# Patient Record
Sex: FEMALE | Race: BLACK OR AFRICAN AMERICAN
[De-identification: names, ages, dates, MRNs, and addresses within clinical notes are randomized per-mention and may not be internally consistent; named-entity substitution may affect disease eponyms.]

---

## 2017-01-16 ENCOUNTER — HOSPITAL ENCOUNTER (INPATIENT)
Dept: HOSPITAL 61 - ER | Age: 52
LOS: 2 days | Discharge: HOME | DRG: 392 | End: 2017-01-18
Attending: INTERNAL MEDICINE | Admitting: INTERNAL MEDICINE
Payer: COMMERCIAL

## 2017-01-16 VITALS — SYSTOLIC BLOOD PRESSURE: 145 MMHG | DIASTOLIC BLOOD PRESSURE: 76 MMHG

## 2017-01-16 VITALS — SYSTOLIC BLOOD PRESSURE: 167 MMHG | DIASTOLIC BLOOD PRESSURE: 76 MMHG

## 2017-01-16 VITALS — BODY MASS INDEX: 36 KG/M2 | WEIGHT: 224 LBS | HEIGHT: 66 IN

## 2017-01-16 VITALS — DIASTOLIC BLOOD PRESSURE: 51 MMHG | SYSTOLIC BLOOD PRESSURE: 124 MMHG

## 2017-01-16 DIAGNOSIS — Z88.6: ICD-10-CM

## 2017-01-16 DIAGNOSIS — D64.9: ICD-10-CM

## 2017-01-16 DIAGNOSIS — I27.2: ICD-10-CM

## 2017-01-16 DIAGNOSIS — K21.9: Primary | ICD-10-CM

## 2017-01-16 DIAGNOSIS — Z82.49: ICD-10-CM

## 2017-01-16 DIAGNOSIS — Z80.49: ICD-10-CM

## 2017-01-16 DIAGNOSIS — Z79.891: ICD-10-CM

## 2017-01-16 DIAGNOSIS — M79.1: ICD-10-CM

## 2017-01-16 DIAGNOSIS — Z90.81: ICD-10-CM

## 2017-01-16 DIAGNOSIS — Z91.19: ICD-10-CM

## 2017-01-16 DIAGNOSIS — Z71.6: ICD-10-CM

## 2017-01-16 DIAGNOSIS — G89.29: ICD-10-CM

## 2017-01-16 DIAGNOSIS — F17.200: ICD-10-CM

## 2017-01-16 DIAGNOSIS — Z88.0: ICD-10-CM

## 2017-01-16 LAB
ALBUMIN SERPL-MCNC: 3.8 G/DL (ref 3.4–5)
ALP SERPL-CCNC: 94 U/L (ref 46–116)
ALT SERPL-CCNC: 22 U/L (ref 14–59)
ANION GAP SERPL CALC-SCNC: 4 MMOL/L (ref 6–14)
ANISOCYTOSIS BLD QL SMEAR: SLIGHT
AST SERPL-CCNC: 25 U/L (ref 15–37)
BASOPHILS # BLD AUTO: 0.1 X10^3/UL (ref 0–0.2)
BASOPHILS NFR BLD: 1 % (ref 0–3)
BILIRUB DIRECT SERPL-MCNC: 0.4 MG/DL (ref 0–0.2)
BILIRUB SERPL-MCNC: 1.7 MG/DL (ref 0.2–1)
BUN SERPL-MCNC: 12 MG/DL (ref 7–20)
CALCIUM SERPL-MCNC: 9.1 MG/DL (ref 8.5–10.1)
CHLORIDE SERPL-SCNC: 108 MMOL/L (ref 98–107)
CHOLEST SERPL-MCNC: 137 MG/DL (ref 0–200)
CHOLEST/HDLC SERPL: 2.6 {RATIO}
CO2 SERPL-SCNC: 32 MMOL/L (ref 21–32)
CREAT SERPL-MCNC: 0.9 MG/DL (ref 0.6–1)
EOSINOPHIL NFR BLD: 3 % (ref 0–3)
ERYTHROCYTE [DISTWIDTH] IN BLOOD BY AUTOMATED COUNT: 15.6 % (ref 11.5–14.5)
GFR SERPLBLD BASED ON 1.73 SQ M-ARVRAT: 80.2 ML/MIN
GLUCOSE SERPL-MCNC: 77 MG/DL (ref 70–99)
HCT VFR BLD CALC: 35.5 % (ref 36–47)
HDLC SERPL-MCNC: 53 MG/DL (ref 40–60)
HGB BLD-MCNC: 11.2 G/DL (ref 12–15.5)
HGB S BLD QL SOLY: (no result)
HYPOCHROMIA BLD QL SMEAR: SLIGHT
LYMPHOCYTES # BLD: 2.6 X10^3/UL (ref 1–4.8)
LYMPHOCYTES NFR BLD AUTO: 22 % (ref 24–48)
MCH RBC QN AUTO: 24 PG (ref 25–35)
MCHC RBC AUTO-ENTMCNC: 32 G/DL (ref 31–37)
MCV RBC AUTO: 76 FL (ref 79–100)
MONOCYTES NFR BLD: 9 % (ref 0–9)
NEUTROPHILS NFR BLD AUTO: 65 % (ref 31–73)
PLATELET # BLD AUTO: 261 X10^3/UL (ref 140–400)
PLATELET # BLD EST: ADEQUATE 10*3/UL
POIKILOCYTOSIS BLD QL SMEAR: PRESENT
POLYCHROMASIA BLD QL SMEAR: SLIGHT
POTASSIUM SERPL-SCNC: 4.3 MMOL/L (ref 3.5–5.1)
PROT SERPL-MCNC: 7.3 G/DL (ref 6.4–8.2)
RBC # BLD AUTO: 4.69 X10^6/UL (ref 3.5–5.4)
SODIUM SERPL-SCNC: 144 MMOL/L (ref 136–145)
TARGETS BLD QL SMEAR: (no result)
TRIGL SERPL-MCNC: 74 MG/DL (ref 0–150)
WBC # BLD AUTO: 11.9 X10^3/UL (ref 4–11)

## 2017-01-16 PROCEDURE — 90686 IIV4 VACC NO PRSV 0.5 ML IM: CPT

## 2017-01-16 PROCEDURE — 80048 BASIC METABOLIC PNL TOTAL CA: CPT

## 2017-01-16 PROCEDURE — 81001 URINALYSIS AUTO W/SCOPE: CPT

## 2017-01-16 PROCEDURE — 99406 BEHAV CHNG SMOKING 3-10 MIN: CPT

## 2017-01-16 PROCEDURE — 96374 THER/PROPH/DIAG INJ IV PUSH: CPT

## 2017-01-16 PROCEDURE — 85007 BL SMEAR W/DIFF WBC COUNT: CPT

## 2017-01-16 PROCEDURE — 93306 TTE W/DOPPLER COMPLETE: CPT

## 2017-01-16 PROCEDURE — 80061 LIPID PANEL: CPT

## 2017-01-16 PROCEDURE — 71020: CPT

## 2017-01-16 PROCEDURE — 93005 ELECTROCARDIOGRAM TRACING: CPT

## 2017-01-16 PROCEDURE — 85027 COMPLETE CBC AUTOMATED: CPT

## 2017-01-16 PROCEDURE — 80076 HEPATIC FUNCTION PANEL: CPT

## 2017-01-16 PROCEDURE — 36415 COLL VENOUS BLD VENIPUNCTURE: CPT

## 2017-01-16 PROCEDURE — 84484 ASSAY OF TROPONIN QUANT: CPT

## 2017-01-16 RX ADMIN — ACETAMINOPHEN PRN MG: 325 TABLET ORAL at 20:47

## 2017-01-16 RX ADMIN — CLONIDINE HYDROCHLORIDE SCH MG: 0.1 TABLET ORAL at 23:03

## 2017-01-16 RX ADMIN — CLONIDINE HYDROCHLORIDE SCH MG: 0.1 TABLET ORAL at 15:30

## 2017-01-16 NOTE — CARD
--------------- APPROVED REPORT --------------





EXAM: Two-dimensional and M-mode echocardiogram with Doppler and color Doppler.



Other Information 

Quality : Good



INDICATION

Hypertension/HCVD

Chest Pain 



2D DIMENSIONS 

RVDd2.7 (2.9-3.5cm)Left Atrium(2D)4.4 (1.6-4.0cm)

IVSd1.2 (0.7-1.1cm)Aortic Root(2D)2.9 (2.0-3.7cm)

LVDd5.2 (3.9-5.9cm)LVOT Diameter2.1 (1.8-2.4cm)

PWd1.3 (0.7-1.1cm)LVDs3.6 (2.5-4.0cm)

FS (%) 32.0 %SV78.1 ml

LVEF(%)59.7 (>50%)



Aortic Valve

AoV Peak Mark.132.8cm/sAoV VTI29.1cm

AO Peak GR.7.1mmHgLVOT Peak Mark.100.7cm/s

LVOT  VTI 22.91cmAO Mean GR.4mmHg

BONNIE (VMAX)2.33vj0PDQ   (VTI)2.64cm2



Mitral Valve

MV E Ohtvullf67.1cm/sMV DECEL [id number]

MV A Jfnsuihf03.8cm/sMV GML86do

E/A  Ratio1.3MVA (PHT)3.19cm2



TDI

E/Lateral E'7.4E/Medial E'9.8



Tricuspid Valve

TR P. Giecadub005pb/sRAP WQFXGZVN7bfPy

TR Peak Gr.24sxPoWSDJ65qwWe



Pulmonary Vein

S1 Auepmqap90.6cm/sD2 Llngdlnm64.2cm/s

PVa sopervqf382kjxy



 LEFT VENTRICLE 

The left ventricle is normal size. There is mild concentric left ventricular hypertrophy. The left ve
ntricular systolic function is normal. The Ejection Fraction is 55-60%. There is normal LV segmental 
wall motion.



 RIGHT VENTRICLE 

The right ventricle is normal size. The right ventricular systolic function is normal.



 ATRIA 

The left atrium is mildly dilated. The right atrium size is normal. The interatrial septum is intact 
with no evidence for an atrial septal defect or patent foramen ovale as noted on 2-D or Doppler imagi
ng.



 AORTIC VALVE 

The aortic valve is normal in structure and function. Doppler and Color Flow revealed trace aortic re
gurgitation. There is no significant aortic valvular stenosis.



 MITRAL VALVE 

The mitral valve is normal in structure and function. There is no evidence of mitral valve prolapse. 
There is no mitral valve stenosis. Doppler and Color-flow revealed trace to mild mitral regurgitation
.



 TRICUSPID VALVE 

The tricuspid valve is normal in structure and function. Doppler and Color Flow revealed mild tricusp
id regurgitation. There is moderate pulmonary hypertension. The PA pressure was estimated at 41 mmHg.
 There is no tricuspid valve stenosis.



 PULMONIC VALVE 

The pulmonary valve is normal in structure and function. Doppler and Color Flow revealed mild pulmoni
c valvular regurgitation. There is no pulmonic valvular stenosis.



 GREAT VESSELS 

The aortic root is normal in size. The ascending aorta is normal in size. The IVC is normal in size a
nd collapses >50% with inspiration.



 PERICARDIAL EFFUSION 

There is no evidence of significant pericardial effusion.



Critical Notification

Critical Value: No



<Conclusion>

The left ventricular systolic function is normal.

The Ejection Fraction is 55-60%.

There is normal LV segmental wall motion.

The left atrium is mildly dilated.

Trace aortic regurgitation.

Trace to mild mitral regurgitation.

Mild tricuspid regurgitation.

There is moderate pulmonary hypertension.

The PA pressure was estimated at 41 mmHg.

There is no evidence of significant pericardial effusion.

## 2017-01-16 NOTE — EKG
[facility]

               [address] [place] [address]

Test Date:    2017               Test Time:    11:12:04

Pat Name:     ROSENDA GREEN           Department:   

Patient ID:   [id number]           Room:          

Gender:       F                        Technician:   

:          [id number]               Requested By: JANET WILKERSON

Order Number: [id number]            Reading MD:   Marilou Lantigua

                                 Measurements

Intervals                              Axis          

Rate:         55                       P:            52

IA:           130                      QRS:          -36

QRSD:         84                       T:            -11

QT:           476                                    

QTc:          458                                    

                           Interpretive Statements

SINUS RHYTHM

LEFT ATRIAL ABNORMALITY

ABNORMAL LEFT AXIS DEVIATION

LEFT ANTERIOR FASCICULAR BLOCK

ABNORMAL ECG



Electronically Signed On 2017 9:43:16 CST by Marilou Lantigua

## 2017-01-16 NOTE — PDOC2
CASSIE GONZALEZ APRN 1/16/17 1421:


CARDIAC CONSULT


DATE OF CONSULT


Date of Consult


DATE: 1/16/17 


TIME: 14:01





REASON FOR CONSULT


Reason for Consult:


CP, h/o sickle cell.





REFERRING PHYSICIAN


Referring Physician:


Black





SOURCE


Source:  Chart review, Patient





HISTORY OF PRESENT ILLNESS


HISTORY OF PRESENT ILLNESS


This is a pleasant 52 yo female admitted for complains of chest pain.  Reports 

that this started yesterday evening and went on overnight on waxing and waning.

  This is not associated with nausea or any SOA.  Reports no lightheadedness 

but this was this was focal to mid chest sharp in consistency and reproducible 

with palpation and with episodes of palpitations.  Also has been having back 

pain but this is chronic for her utilizing opioids.  Denies any falls, recent 

injury, long distance driving.  No prior cough or respiratory infection.  She 

does not take her BP readings at home and verbalized 3 BP meds but she does not 

take this regularly and has been skipping.  She did end up taking her antiHTN 

meds last night with addition of ASA.  She continue to smoke tobacco and her 

diet comprises of processed meals with very high sodium content as noted by her 

significant other as well. Also reports that she has not been drinking 

adequately. Activity wise, notable for METS>10, doing daily laundry utilizing 

stairs at least 4x daily.





PAST MEDICAL HISTORY


Cardiovascular:  HTN


Pulmonary:  No pertinent hx


CENTRAL NERVOUS SYSTEM:  Other (No pertinet history)


GI:  No pertinent hx


Heme/Onc:  No pertinent hx, Other (Sickle cell beta thalassemia)


Hepatobiliary:  No pertinent hx


Psych:  No pertinent hx


Musculoskeletal:  Other (chronic back pain)


Rheumatologic:  No pertinent hx


Infectious disease:  No pertinent hx


ENT:  No pertinent hx


Renal/:  UTI


Endocrine:  No pertinent hx


Dermatology:  No pertinent hx





PAST SURGICAL HISTORY


Past Surgical History:  Tubal Ligation, Other (splenectomy; hx of brain surgery 

involving swelling and AVM? pt is not sure)





FAMILY HISTORY


Family History:  Other (sickel cell)





SOCIAL HISTORY


Smoke:  <1 pack per day (>10 yrs)


ALCOHOL:  none


Drugs:  None


Lives:  with Family





CURRENT MEDICATIONS


CURRENT MEDICATIONS





Current Medications








 Medications


  (Trade)  Dose


 Ordered  Sig/Yonas


 Route


 PRN Reason  Start Time


 Stop Time Status Last Admin


Dose Admin


 


 Aspirin 324 mg  324 mg  1X  ONCE


 PO


   1/16/17 12:00


 1/16/17 12:01 DC 1/16/17 12:00


 


 


 Sodium Chloride


  (Iv Sodium


 Chloride 0.9%


 1000ml Bag)  1,000 ml @ 


 1,000 mls/hr  Q1H


 IV


   1/16/17 11:48


 1/16/17 12:47 DC 1/16/17 11:48


 


 


 Fentanyl Citrate


  (Fentanyl 2ml


 Vial)  50 mcg  1X  ONCE


 IV


   1/16/17 12:00


 1/16/17 12:01 DC 1/16/17 12:32


 


 


 Clonidine HCl


  (Catapres)  0.1 mg  1X  ONCE


 PO


   1/16/17 12:45


 1/16/17 12:46 DC 1/16/17 12:45


 


 


 Amlodipine


 Besylate


  (Norvasc)  5 mg  1X  ONCE


 PO


   1/16/17 12:45


 1/16/17 12:46 DC 1/16/17 13:14


 











ALLERGIES


ALLERGIES:  


Coded Allergies:  


     morphine (Verified  Allergy, Intermediate, RASH, ITCHING, tolerates 

Dilaudid, 8/10/16)


 PATIENT USUALLY NEEDS TO TAKE BENADRYL WITH ANY PAIN


 MEDICATON SHE TAKES.


     penicillin (Verified  Allergy, Intermediate, RASH/HIVES, 8/10/16)





ROS


Review of System


14 point ROS evaluated with pertinent positives noted per HPI





PHYSICAL EXAM


General:  Alert, Oriented X3, Cooperative, No acute distress


HEENT:  Atraumatic, Mucous membr. moist/pink


Lungs:  Clear to auscultation, Normal air movement


Heart:  Regular rate, Normal S1, Normal S2, Other (2/6 systolic murmur to LLS 

border)


Extremities:  No cyanosis, No edema


Skin:  No breakdown, No significant lesion


Neuro:  Normal speech, Sensation intact


Psych/Mental Status:  Mental status NL, Mood NL


MUSCULOSKELETAL:  Osteoarthritic changes both hands





VITALS


VITALS





 Vital Signs








  Date Time  Temp Pulse Resp B/P Pulse Ox O2 Delivery O2 Flow Rate FiO2


 


1/16/17 13:14  48  199/99    


 


1/16/17 11:11 98.4  20  98 Room Air  





 98.4       











LABS


Lab:





Laboratory Tests








Test


  1/16/17


11:20


 


White Blood Count


  11.9x10^3/uL


(4.0-11.0)


 


Red Blood Count


  4.69x10^6/uL


(3.50-5.40)


 


Hemoglobin


  11.2g/dL


(12.0-15.5)


 


Hematocrit


  35.5%


(36.0-47.0)


 


Mean Corpuscular Volume 76fL () 


 


Mean Corpuscular Hemoglobin 24pg (25-35) 


 


Mean Corpuscular Hemoglobin


Concent 32g/dL (31-37) 


 


 


Red Cell Distribution Width


  15.6%


(11.5-14.5)


 


Platelet Count


  261x10^3/uL


(140-400)


 


Neutrophils (%) (Auto) 65% (31-73) 


 


Lymphocytes (%) (Auto) 22% (24-48) 


 


Monocytes (%) (Auto) 9% (0-9) 


 


Eosinophils (%) (Auto) 3% (0-3) 


 


Basophils (%) (Auto) 1% (0-3) 


 


Neutrophils # (Auto)


  7.8x10^3uL


(1.8-7.7)


 


Lymphocytes # (Auto)


  2.6x10^3/uL


(1.0-4.8)


 


Monocytes # (Auto)


  1.1x10^3/uL


(0.0-1.1)


 


Eosinophils # (Auto)


  0.4x10^3/uL


(0.0-0.7)


 


Basophils # (Auto)


  0.1x10^3/uL


(0.0-0.2)


 


Platelet Estimate


  Adequate


(ADEQUATE)


 


Large Platelets Present 


 


Polychromasia Slight 


 


Hypochromasia Slight 


 


Poikilocytosis Present 


 


Anisocytosis Slight 


 


Sickle Cells Few 


 


Target Cells Mod 


 


Sodium Level


  144mmol/L


(136-145)


 


Potassium Level


  4.3mmol/L


(3.5-5.1)


 


Chloride Level


  108mmol/L


()


 


Carbon Dioxide Level


  32mmol/L


(21-32)


 


Anion Gap 4 (6-14) 


 


Blood Urea Nitrogen 12mg/dL (7-20) 


 


Creatinine


  0.9mg/dL


(0.6-1.0)


 


Estimated GFR


(Cockcroft-Gault) 80.2 


 


 


Glucose Level


  77mg/dL


(70-99)


 


Calcium Level


  9.1mg/dL


(8.5-10.1)


 


Troponin I Quantitative


  0.045ng/mL


(0.000-0.055)











ASSESSMENT/PLAN


ASSESSMENT/PLAN


1. Atypical chest pain: retrosternal reproducible with palpation. initial 

troponin normal.  EKG SR with LAFB with otherwise no acute changes. Doubt ACS. 

Low clinical probability for PE. Likely related to uncontrolled HTN with 

associated MSK component. 





2. Accelerated HTN: initial BP at 220/100.  Received  clonidine, amlodipine and 

fentanyl so far. Remains labile. Main culprit is noncompliance, skipping meds 

and high Na in diet with associated back pain. To add only takes clonidine at 

hs. 





3. 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  Per PCP





4. Chronic back pain with chronic opioid use. 





5. Tobaccoism





6. Right lateral back fibroma? Defer to PCP





Plan





1. Trend troponin, TTE today and note any hypertensive heart disease. 





2. Will stop home bystolic with noted bradycardia in the 40s.  Would not 

recommend clonidine/BB combination as well





3.  Increase home amlodipine and will titrate up clonidine (takes this once at 

bedtime).  Add losartan. Hydralazine IV prn. 





4. Continue with home ECASA. 





5. Encourage lifestyle modification including diet exercise and smoking 

cessation.  





6. Continue with SC crisis prevention.


Problems:  





MABEL GROVE MD 1/16/17 2015:


CARDIAC CONSULT


ALLERGIES


ALLERGIES:  


Coded Allergies:  


     morphine (Verified  Allergy, Intermediate, RASH, ITCHING, tolerates 

Dilaudid, 8/10/16)


 PATIENT USUALLY NEEDS TO TAKE BENADRYL WITH ANY PAIN


 MEDICATON SHE TAKES.


     penicillin (Verified  Allergy, Intermediate, RASH/HIVES, 8/10/16)





ASSESSMENT/PLAN


ASSESSMENT/PLAN


Pt. seen and examined. Agree with above NP note. 


51 y.o with beta thal. 


Reports chest pain similar to prior sickle crisis. Unclear what the trigger is. 

She is a significant smoker


Cardiac exam unremarkable


Labs/meds reviewed. 


Echo w/ normal LV function 


Continue supportive care. 


Will follow.


Problems:  








CASSIE GONZALEZ Jan 16, 2017 14:21


MABEL GROVE MD Jan 16, 2017 20:15

## 2017-01-16 NOTE — HP
ADMIT DATE:  01/16/2017



CHIEF COMPLAINT:  Chest pain.



HISTORY OF PRESENT ILLNESS:  The patient is a pleasant 51-year-old female who

has sickle cell disease.  Today, she presents with chest pain.  She states her

pain has a little different feeling than with her normal sickle cell crisis. 

Indeed, her troponin is slightly elevated at 0.045.  I have discussed the case

with the ER physician.  We are going to admit the patient and consult

Cardiology.  It should be noted the patient describes her pain as pressure like

in sensation and rates it at 9/10, has been coming on for several days.



PAST MEDICAL HISTORY:  Sickle cell disease, chronic pain.



ALLERGIES:  Morphine and penicillin.



FAMILY HISTORY:  Coronary artery disease.



SOCIAL HISTORY:  She does not drink, smoke or take drugs.



MEDICATIONS:  Reviewed, please refer to the MRAD.



REVIEW OF SYSTEMS:  

GENERAL:  No history of weight change, weakness or fevers.

SKIN:  No bruising, hair changes or rashes.

EYES:  No blurred, double or loss of vision.

NOSE AND THROAT:  No history of nosebleeds, hoarseness or sore throat.

CARDIAC:  Complains of chest pain.

LUNGS:  Denies cough, hemoptysis, wheezing or shortness of breath.

GASTROINTESTINAL:  Denies changes in appetite, nausea, vomiting, diarrhea or

constipation.

GENITOURINARY:  No history of frequency, urgency, hesitancy or nocturia.

NEUROLOGIC:  Denies history of numbness, tingling, tremor or weakness.

PSYCHIATRIC:  No history of panic, anxiety or depression.

ENDOCRINE:  No history of heat or cold intolerance, polyuria or polydipsia.

EXTREMITIES:  Denies muscle weakness, joint pain, pain on walking or stiffness.



PHYSICAL EXAMINATION:

VITAL SIGNS:  Temperature afebrile, pulse 50, respirations 19, blood pressure

ranging from 194/94 up to as high as 221/95.

GENERAL:  She is alert, cooperative, weak.

HEART:  Distant S1, S2.

LUNGS:  Clear.

ABDOMEN:  Soft, positive bowel sounds.

EXTREMITIES:  Trace edema.

SKIN:  No rashes.

PSYCHIATRIC:  She seems a little depressed.

VASCULAR:  Good capillary refill.

ENDOCRINE:  No thyromegaly.

LYMPHATICS:  No cervical nodes.

HEMATOPOIETIC:  No bruising.



LABORATORY DATA:  Troponin 0.045.  Electrolytes were normal other than chloride

of 108 and anion gap of 4.  Hematology:  White count 11.9, hemoglobin 11.2,

platelets 261.



ASSESSMENT AND PLAN:  Chest pain with accelerated hypertension.  The patient is

being admitted.  We will consult Cardiology; serial enzymes, serial EKGs. 

Continue her home medicines, frequent labs, PT, OT.  We will start empiric

Rocephin.  Consult Dr. Monzon for a second opinion regarding the history of sickle

cell.

 



______________________________

JADA LEAL DO



DR:  FRANCISCO JAVIER/brigitte  JOB#:  [id number] / [id number]

DD:  01/16/2017 16:54  DT:  01/16/2017 19:08

## 2017-01-16 NOTE — PHYS DOC
Past Medical History


Past Medical History:  Sickle Cell Disease


Past Surgical History:  , Other


Additional Past Surgical Histo:  splenectomy, RIGHT SHOULDER,


Alcohol Use:  Occasionally


Drug Use:  None





Adult General


Chief Complaint


Chief Complaint:  CHEST PAIN





HPI


HPI


Patient is a 51  year old female who presents with pain crisis. Patient has h/o 

sickle cell and reports this is similar to prior pain crises except she usually 

does not have pain in her chest. Also having pain in her back, arms, legs. 

Patient reports symptoms started yesterday evening without provocation. She 

describes a constant throbbing/sharp pain without mitigating factors. She also 

feels short of breath. No fever. Patient has tried hydrocodone at home with 

insufficient relief.





Review of Systems


Review of Systems


Constitutional: Denies fever or chills 


Eyes: Denies change in visual acuity or eye pain 


HENT: Denies nasal congestion or sore throat 


Respiratory: Shortness of breath. Denies cough


Cardiovascular: Chest pain


GI: Denies abdominal pain, nausea, vomiting, bloody stools or diarrhea 


: Denies dysuria or hematuria 


Musculoskeletal: Back, arm, leg pain


Integument: Denies rash or skin lesions 


Neurologic: Denies headache, focal weakness or sensory changes





Current Medications


Current Medications





 Current Medications








 Medications


  (Trade)  Dose


 Ordered  Sig/Yonas  Start Time


 Stop Time Status Last Admin


Dose Admin


 


 Amlodipine


 Besylate


  (Norvasc)  5 mg  1X  ONCE  17 12:45


 17 12:46 DC 17 13:14


5 MG


 


 Aspirin 324 mg  324 mg  1X  ONCE  17 12:00


 17 12:01 DC 17 12:00


324 MG


 


 Clonidine HCl


  (Catapres)  0.1 mg  1X  ONCE  17 12:45


 17 12:46 DC 17 12:45


0.1 MG


 


 Fentanyl Citrate


  (Fentanyl 2ml


 Vial)  50 mcg  1X  ONCE  17 12:00


 17 12:01 DC 17 12:32


50 MCG


 


 Sodium Chloride


  (Iv Sodium


 Chloride 0.9%


 1000ml Bag)  1,000 ml @ 


 1,000 mls/hr  Q1H  17 11:48


 17 12:47 DC 17 11:48


1,000 MLS/HR











Allergies


Allergies





 Allergies








Coded Allergies Type Severity Reaction Last Updated Verified


 


  morphine Allergy Intermediate RASH, ITCHING, tolerates Dilaudid 8/10/16 Yes


 


  penicillin Allergy Intermediate RASH/HIVES 8/10/16 Yes











Physical Exam


Physical Exam


Constitutional: Well developed, well nourished, no acute distress, non-toxic 

appearance 


HENT: Normocephalic, atraumatic, bilateral external ears normal


Eyes: EOMI, conjunctiva normal, no discharge


Neck: Normal range of motion, no stridor


Cardiovascular: Bradycardic, regular rhythm, no murmur 


Lungs & Thorax:  Bilateral breath sounds clear to auscultation; anterior chest 

wall TTP


Abdomen: Bowel sounds normal, soft, non-distended, no TTP


Skin: Warm, dry, no erythema, no rash


Extremities: Extremities and back all visually unremarkable; distal pulses, 

sensation to light touch, motor function intact throughout


Neurologic: Alert and oriented X 3, no gross deficits noted





Current Patient Data


Vital Signs





 Vital Signs








  Date Time  Temp Pulse Resp B/P Pulse Ox O2 Delivery O2 Flow Rate FiO2


 


17 13:14  48  199/99    


 


17 13:00   19  99   


 


17 11:11 98.4     Room Air  





 98.4       








Lab Values





 Laboratory Tests








Test


  17


11:20


 


White Blood Count


  11.9x10^3/uL


(4.0-11.0)  H


 


Red Blood Count


  4.69x10^6/uL


(3.50-5.40)


 


Hemoglobin


  11.2g/dL


(12.0-15.5)  L


 


Hematocrit


  35.5%


(36.0-47.0)  L


 


Mean Corpuscular Volume


  76fL ()


L


 


Mean Corpuscular Hemoglobin 24pg (25-35)  L


 


Mean Corpuscular Hemoglobin


Concent 32g/dL (31-37)


 


 


Red Cell Distribution Width


  15.6%


(11.5-14.5)  H


 


Platelet Count


  261x10^3/uL


(140-400)


 


Neutrophils (%) (Auto) 65% (31-73)  


 


Lymphocytes (%) (Auto) 22% (24-48)  L


 


Monocytes (%) (Auto) 9% (0-9)  


 


Eosinophils (%) (Auto) 3% (0-3)  


 


Basophils (%) (Auto) 1% (0-3)  


 


Neutrophils # (Auto)


  7.8x10^3uL


(1.8-7.7)  H


 


Lymphocytes # (Auto)


  2.6x10^3/uL


(1.0-4.8)


 


Monocytes # (Auto)


  1.1x10^3/uL


(0.0-1.1)


 


Eosinophils # (Auto)


  0.4x10^3/uL


(0.0-0.7)


 


Basophils # (Auto)


  0.1x10^3/uL


(0.0-0.2)


 


Platelet Estimate


  Adequate


(ADEQUATE)


 


Large Platelets Present  


 


Polychromasia Slight  


 


Hypochromasia Slight  


 


Poikilocytosis Present  


 


Anisocytosis Slight  


 


Sickle Cells Few  


 


Target Cells Mod  


 


Sodium Level


  144mmol/L


(136-145)


 


Potassium Level


  4.3mmol/L


(3.5-5.1)


 


Chloride Level


  108mmol/L


()  H


 


Carbon Dioxide Level


  32mmol/L


(21-32)


 


Anion Gap 4 (6-14)  L


 


Blood Urea Nitrogen


  12mg/dL (7-20)


 


 


Creatinine


  0.9mg/dL


(0.6-1.0)


 


Estimated GFR


(Cockcroft-Gault) 80.2  


 


 


Glucose Level


  77mg/dL


(70-99)


 


Calcium Level


  9.1mg/dL


(8.5-10.1)


 


Total Bilirubin


  1.7mg/dL


(0.2-1.0)  H


 


Direct Bilirubin


  0.4mg/dL


(0.0-0.2)  H


 


Aspartate Amino Transferase


(AST) 25U/L (15-37)  


 


 


Alanine Aminotransferase (ALT) 22U/L (14-59)  


 


Alkaline Phosphatase


  94U/L ()


 


 


Troponin I Quantitative


  0.045ng/mL


(0.000-0.055)


 


Total Protein


  7.3g/dL


(6.4-8.2)


 


Albumin


  3.8g/dL


(3.4-5.0)


 


Triglycerides Level


  74mg/dL


(0-150)


 


Cholesterol Level


  137mg/dL


(0-200)


 


LDL Cholesterol, Calculated


  69mg/dL


(0-100)


 


VLDL Cholesterol, Calculated


  15mg/dL (0-40)


 


 


HDL Cholesterol


  53mg/dL


(40-60)


 


Cholesterol/HDL Ratio 2.6  





 Laboratory Tests


17 11:20








 Laboratory Tests


17 11:20











EKG


EKG


EKG (my read): sinus bradycardia, rate 55, LAD, no acute ischemic changes





Radiology/Procedures


Radiology/Procedures


CXR:


IMPRESSION: No acute or focal process. No significant change





Course & Med Decision Making


Course & Med Decision Making


Pertinent Labs and Imaging studies reviewed. (See chart for details)


Patient is 51-year-old female who presents with generalized body pain. Likely 

sickle pain crisis. Must also consider acute chest syndrome or acute coronary 

syndrome given complaint of pain in chest. EKG, chest x-ray, labs ordered to 

evaluate. IV fluids, pain meds ordered for relief of symptoms. Dose of home 

blood pressure meds ordered due to hypertension. Imaging results as above.Labs 

notable for minimal leukocytosis (appears to be baseline) and mild anemia. 

Troponin wnl but is 0.045. Discussed with patient. Discussed with Dr. Meyer, 

will admit under his care for further evaluation and treatment.





Dragon Disclaimer


Dragon Disclaimer


This electronic medical record was generated, in whole or in part, using a 

voice recognition dictation system.





Departure


Departure


Impression:  


 Primary Impression:  


 Sickle cell pain crisis


 Additional Impression:  


 Chest pain


Disposition:  09 ADMITTED AS INPATIENT


Admitting Physician:  Michael Meyer


Condition:  STABLE


Referrals:  


KEEGAN AMES (PCP)





Problem Qualifiers








JANET WILKERSON MD 2017 11:47

## 2017-01-16 NOTE — RAD
Indication chest pain and shortness of breath



PA and lateral views of the chest were obtained. Comparison is made to an

examination 8/11/2016.



The heart and pulmonary vessels appear normal. The lungs are clear. There is

no pleural fluid or pneumothorax. Chronic musculoskeletal changes associated

with both shoulders is noted. A significant change in the appearance of the

chest compared to the previous exam is not seen.



IMPRESSION: No acute or focal process. No significant change

## 2017-01-17 VITALS — DIASTOLIC BLOOD PRESSURE: 62 MMHG | SYSTOLIC BLOOD PRESSURE: 128 MMHG

## 2017-01-17 VITALS — DIASTOLIC BLOOD PRESSURE: 88 MMHG | SYSTOLIC BLOOD PRESSURE: 148 MMHG

## 2017-01-17 VITALS — SYSTOLIC BLOOD PRESSURE: 151 MMHG | DIASTOLIC BLOOD PRESSURE: 81 MMHG

## 2017-01-17 VITALS — DIASTOLIC BLOOD PRESSURE: 77 MMHG | SYSTOLIC BLOOD PRESSURE: 131 MMHG

## 2017-01-17 VITALS — SYSTOLIC BLOOD PRESSURE: 164 MMHG | DIASTOLIC BLOOD PRESSURE: 68 MMHG

## 2017-01-17 VITALS — SYSTOLIC BLOOD PRESSURE: 133 MMHG | DIASTOLIC BLOOD PRESSURE: 64 MMHG

## 2017-01-17 VITALS — DIASTOLIC BLOOD PRESSURE: 69 MMHG | SYSTOLIC BLOOD PRESSURE: 138 MMHG

## 2017-01-17 LAB
BACTERIA #/AREA URNS HPF: 0 /HPF
BILIRUB UR QL STRIP: NEGATIVE
GLUCOSE UR STRIP-MCNC: NEGATIVE MG/DL
NITRITE UR QL STRIP: NEGATIVE
PH UR STRIP: 6 [PH]
PROT UR STRIP-MCNC: NEGATIVE MG/DL
RBC #/AREA URNS HPF: 0 /HPF (ref 0–2)
SP GR UR STRIP: 1.01
SQUAMOUS #/AREA URNS LPF: (no result) /LPF
UROBILINOGEN UR-MCNC: 4 MG/DL
WBC #/AREA URNS HPF: (no result) /HPF (ref 0–4)

## 2017-01-17 RX ADMIN — ASPIRIN SCH MG: 81 TABLET, COATED ORAL at 07:57

## 2017-01-17 RX ADMIN — LOSARTAN POTASSIUM SCH MG: 50 TABLET, FILM COATED ORAL at 20:37

## 2017-01-17 RX ADMIN — ACETAMINOPHEN PRN MG: 325 TABLET ORAL at 07:57

## 2017-01-17 RX ADMIN — OXYCODONE HYDROCHLORIDE PRN MG: 5 TABLET ORAL at 22:34

## 2017-01-17 RX ADMIN — AMLODIPINE BESYLATE SCH MG: 10 TABLET ORAL at 07:58

## 2017-01-17 RX ADMIN — CLONIDINE HYDROCHLORIDE SCH MG: 0.1 TABLET ORAL at 13:58

## 2017-01-17 RX ADMIN — CLONIDINE HYDROCHLORIDE SCH MG: 0.1 TABLET ORAL at 06:08

## 2017-01-17 RX ADMIN — LOSARTAN POTASSIUM SCH MG: 50 TABLET, FILM COATED ORAL at 07:57

## 2017-01-17 RX ADMIN — OXYCODONE HYDROCHLORIDE PRN MG: 5 TABLET ORAL at 13:59

## 2017-01-17 RX ADMIN — OXYCODONE HYDROCHLORIDE PRN MG: 5 TABLET ORAL at 18:18

## 2017-01-17 NOTE — PDOC
Provider Note


Provider Note


Onc consult dictated- [id number]





Sickle cell/ B thal- Few sickle cells seen on smear, hgb near normal. ? true 

diagnosis (which made pt very upset), but regardless, it's not contributing to 

her chest pain currently. 


F/u with her hematologist as an outpt.





D/W Dr. Winkler.








CECILIA MARTINEZ DO Jan 17, 2017 09:04

## 2017-01-17 NOTE — PDOC
PROGRESS NOTES


Chief Complaint


Chief Complaint


Pain crisis








ASSESSMENT AND PLAN:


1.  Anemia:  NOT SS dz!  appreciate Dr Mead's input.  in discussion with pt, 

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  

discussed with her, that she has sickle cell trait (which does not cause pain 

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 

but never pain crises.  Hgb and retic near normal, speaking against sickle cell 

crisis as well.


2.  back pain:  recurrent acc to patient, joyce  "when i get stressed at work".  

musculoskeletal pain.  discussed regimen with her:  naproxen scheduled and oxy 

PRN


3.  Dispo:  D/C prob in AM





Vitals


Vitals





 Vital Signs








  Date Time  Temp Pulse Resp B/P Pulse Ox O2 Delivery O2 Flow Rate FiO2


 


1/17/17 15:01      Room Air  


 


1/17/17 15:00 98.0 49 17 138/69 97   





 98.0       











Physical Exam


General:  Alert, Oriented X3, Cooperative, No acute distress


Heart:  Regular rate, Normal S1, Normal S2, Other (2/6 systolic murmur to LLS 

border)


Extremities:  No cyanosis, No edema


Skin:  No breakdown, No significant lesion





Labs


LABS





Laboratory Tests








Test


  1/16/17


19:30 1/17/17


00:40 1/17/17


09:40


 


Troponin I Quantitative


  0.041ng/mL


(0.000-0.055) 


  0.022ng/mL


(0.000-0.055)


 


Urine Collection Type  Unknown  


 


Urine Color  Yellow  


 


Urine Clarity  Clear  


 


Urine pH  6.0  


 


Urine Specific Gravity  1.010  


 


Urine Protein


  


  Negativemg/dL


(NEG-TRACE) 


 


 


Urine Glucose (UA)


  


  Negativemg/dL


(NEG) 


 


 


Urine Ketones (Stick)


  


  Negativemg/dL


(NEG) 


 


 


Urine Blood  Negative (NEG)  


 


Urine Nitrite  Negative (NEG)  


 


Urine Bilirubin  Negative (NEG)  


 


Urine Urobilinogen Dipstick


  


  4.0mg/dL (0.2


mg/dL) 


 


 


Urine Leukocyte Esterase  Negative (NEG)  


 


Urine RBC  0/HPF (0-2)  


 


Urine WBC  Occ/HPF (0-4)  


 


Urine Squamous Epithelial


Cells 


  Mod/LPF 


  


 


 


Urine Bacteria  0/HPF (0-FEW)  











Review of Systems


Review of Systems


pain well controlled, 3-4 per pt.  upset about discussion of her pain not being 

due to sickle cell dz (does not have)





Assessment and Plan


Assessmemt and Plan


30+ min spent in discussion with pt re dx and sx


Problems:  





Comment


Review of Relevant


I have reviewed the following items nola (where applicable) has been applied.


Labs





Laboratory Tests








Test


  1/16/17


11:20 1/16/17


19:30 1/17/17


00:40 1/17/17


09:40


 


White Blood Count


  11.9x10^3/uL


(4.0-11.0) 


  


  


 


 


Red Blood Count


  4.69x10^6/uL


(3.50-5.40) 


  


  


 


 


Hemoglobin


  11.2g/dL


(12.0-15.5) 


  


  


 


 


Hematocrit


  35.5%


(36.0-47.0) 


  


  


 


 


Mean Corpuscular Volume 76fL ()    


 


Mean Corpuscular Hemoglobin 24pg (25-35)    


 


Mean Corpuscular Hemoglobin


Concent 32g/dL (31-37) 


  


  


  


 


 


Red Cell Distribution Width


  15.6%


(11.5-14.5) 


  


  


 


 


Platelet Count


  261x10^3/uL


(140-400) 


  


  


 


 


Neutrophils (%) (Auto) 65% (31-73)    


 


Lymphocytes (%) (Auto) 22% (24-48)    


 


Monocytes (%) (Auto) 9% (0-9)    


 


Eosinophils (%) (Auto) 3% (0-3)    


 


Basophils (%) (Auto) 1% (0-3)    


 


Neutrophils # (Auto)


  7.8x10^3uL


(1.8-7.7) 


  


  


 


 


Lymphocytes # (Auto)


  2.6x10^3/uL


(1.0-4.8) 


  


  


 


 


Monocytes # (Auto)


  1.1x10^3/uL


(0.0-1.1) 


  


  


 


 


Eosinophils # (Auto)


  0.4x10^3/uL


(0.0-0.7) 


  


  


 


 


Basophils # (Auto)


  0.1x10^3/uL


(0.0-0.2) 


  


  


 


 


Platelet Estimate


  Adequate


(ADEQUATE) 


  


  


 


 


Large Platelets Present    


 


Polychromasia Slight    


 


Hypochromasia Slight    


 


Poikilocytosis Present    


 


Anisocytosis Slight    


 


Sickle Cells Few    


 


Target Cells Mod    


 


Sodium Level


  144mmol/L


(136-145) 


  


  


 


 


Potassium Level


  4.3mmol/L


(3.5-5.1) 


  


  


 


 


Chloride Level


  108mmol/L


() 


  


  


 


 


Carbon Dioxide Level


  32mmol/L


(21-32) 


  


  


 


 


Anion Gap 4 (6-14)    


 


Blood Urea Nitrogen 12mg/dL (7-20)    


 


Creatinine


  0.9mg/dL


(0.6-1.0) 


  


  


 


 


Estimated GFR


(Cockcroft-Gault) 80.2 


  


  


  


 


 


Glucose Level


  77mg/dL


(70-99) 


  


  


 


 


Calcium Level


  9.1mg/dL


(8.5-10.1) 


  


  


 


 


Total Bilirubin


  1.7mg/dL


(0.2-1.0) 


  


  


 


 


Direct Bilirubin


  0.4mg/dL


(0.0-0.2) 


  


  


 


 


Aspartate Amino Transf


(AST/SGOT) 25U/L (15-37) 


  


  


  


 


 


Alanine Aminotransferase


(ALT/SGPT) 22U/L (14-59) 


  


  


  


 


 


Alkaline Phosphatase 94U/L ()    


 


Troponin I Quantitative


  0.045ng/mL


(0.000-0.055) 0.041ng/mL


(0.000-0.055) 


  0.022ng/mL


(0.000-0.055)


 


Total Protein


  7.3g/dL


(6.4-8.2) 


  


  


 


 


Albumin


  3.8g/dL


(3.4-5.0) 


  


  


 


 


Triglycerides Level


  74mg/dL


(0-150) 


  


  


 


 


Cholesterol Level


  137mg/dL


(0-200) 


  


  


 


 


LDL Cholesterol, Calculated


  69mg/dL


(0-100) 


  


  


 


 


VLDL Cholesterol, Calculated 15mg/dL (0-40)    


 


HDL Cholesterol


  53mg/dL


(40-60) 


  


  


 


 


Cholesterol/HDL Ratio 2.6    


 


Urine Collection Type   Unknown  


 


Urine Color   Yellow  


 


Urine Clarity   Clear  


 


Urine pH   6.0  


 


Urine Specific Gravity   1.010  


 


Urine Protein


  


  


  Negativemg/dL


(NEG-TRACE) 


 


 


Urine Glucose (UA)


  


  


  Negativemg/dL


(NEG) 


 


 


Urine Ketones (Stick)


  


  


  Negativemg/dL


(NEG) 


 


 


Urine Blood   Negative (NEG)  


 


Urine Nitrite   Negative (NEG)  


 


Urine Bilirubin   Negative (NEG)  


 


Urine Urobilinogen Dipstick


  


  


  4.0mg/dL (0.2


mg/dL) 


 


 


Urine Leukocyte Esterase   Negative (NEG)  


 


Urine RBC   0/HPF (0-2)  


 


Urine WBC   Occ/HPF (0-4)  


 


Urine Squamous Epithelial


Cells 


  


  Mod/LPF 


  


 


 


Urine Bacteria   0/HPF (0-FEW)  








Laboratory Tests








Test


  1/16/17


19:30 1/17/17


00:40 1/17/17


09:40


 


Troponin I Quantitative


  0.041ng/mL


(0.000-0.055) 


  0.022ng/mL


(0.000-0.055)


 


Urine Collection Type  Unknown  


 


Urine Color  Yellow  


 


Urine Clarity  Clear  


 


Urine pH  6.0  


 


Urine Specific Gravity  1.010  


 


Urine Protein


  


  Negativemg/dL


(NEG-TRACE) 


 


 


Urine Glucose (UA)


  


  Negativemg/dL


(NEG) 


 


 


Urine Ketones (Stick)


  


  Negativemg/dL


(NEG) 


 


 


Urine Blood  Negative (NEG)  


 


Urine Nitrite  Negative (NEG)  


 


Urine Bilirubin  Negative (NEG)  


 


Urine Urobilinogen Dipstick


  


  4.0mg/dL (0.2


mg/dL) 


 


 


Urine Leukocyte Esterase  Negative (NEG)  


 


Urine RBC  0/HPF (0-2)  


 


Urine WBC  Occ/HPF (0-4)  


 


Urine Squamous Epithelial


Cells 


  Mod/LPF 


  


 


 


Urine Bacteria  0/HPF (0-FEW)  








Medications





 Current Medications


Aspirin 324 mg 324 mg 1X  ONCE PO  Last administered on 1/16/17at 12:00;  Start 

1/16/17 at 12:00;  Stop 1/16/17 at 12:01;  Status DC


Sodium Chloride (Iv Sodium Chloride 0.9% 1000ml Bag) 1,000 ml @  1,000 mls/hr 

Q1H IV  Last administered on 1/16/17at 11:48;  Start 1/16/17 at 11:48;  Stop 1/ 16/17 at 12:47;  Status DC


Fentanyl Citrate (Fentanyl 2ml Vial) 50 mcg 1X  ONCE IV  Last administered on 1/ 16/17at 12:32;  Start 1/16/17 at 12:00;  Stop 1/16/17 at 12:01;  Status DC


Clonidine HCl (Catapres) 0.1 mg 1X  ONCE PO  Last administered on 1/16/17at 12:

45;  Start 1/16/17 at 12:45;  Stop 1/16/17 at 12:46;  Status DC


Amlodipine Besylate (Norvasc) 5 mg 1X  ONCE PO  Last administered on 1/16/17at 

13:14;  Start 1/16/17 at 12:45;  Stop 1/16/17 at 12:46;  Status DC


Ondansetron HCl (Zofran) 4 mg PRN Q8HRS  PRN IV NAUSEA/VOMITING;  Start 1/16/17 

at 13:45;  Stop 1/17/17 at 13:44;  Status DC


Fentanyl Citrate (Fentanyl 2ml Vial) 50 mcg PRN Q1HR  PRN IV PAIN Last 

administered on 1/17/17at 10:38;  Start 1/16/17 at 13:45;  Stop 1/17/17 at 13:44

;  Status DC


Acetaminophen (Tylenol) 650 mg PRN Q4HRS  PRN PO FEVER Last administered on 1/17 /17at 07:57;  Start 1/16/17 at 13:45;  Stop 1/17/17 at 13:44;  Status DC


Amlodipine Besylate (Norvasc) 10 mg DAILY PO  Last administered on 1/17/17at 07:

58;  Start 1/17/17 at 09:00


Amlodipine Besylate (Norvasc) 5 mg 1X  ONCE PO ;  Start 1/16/17 at 15:00;  Stop 

1/16/17 at 15:04;  Status DC


Losartan Potassium (Cozaar) 50 mg DAILY PO  Last administered on 1/17/17at 07:57

;  Start 1/17/17 at 09:00;  Stop 1/18/17 at 09:00


Clonidine HCl (Catapres) 0.1 mg Q8HRS PO  Last administered on 1/17/17at 06:08;

  Start 1/16/17 at 15:30;  Stop 1/17/17 at 14:11;  Status DC


Hydralazine HCl (Apresoline) 10 mg PRN Q4HRS  PRN IVP ELEVATED BP, SEE COMMENTS

;  Start 1/16/17 at 15:00


Aspirin (Ecotrin) 81 mg DAILYWBKFT PO  Last administered on 1/17/17at 07:57;  

Start 1/17/17 at 08:00


Info (Do NOT chart on this placeholder) 1 each 1X  ONCE MC ;  Start 1/16/17 at 

20:15;  Stop 1/16/17 at 20:16;  Status UNV


Pneumococcal Polyvalent Vaccine (Do NOT chart on this placeholder) 1 each 1X  

ONCE MC ;  Start 1/16/17 at 20:15;  Stop 1/16/17 at 20:16;  Status UNV


Influenza Virus Vaccine Quadrival (Fluarix Quad [id number] Syringe) 0.5 ml ONCE 

ONCE VAX IM ;  Start 1/16/17 at 20:30;  Stop 1/16/17 at 20:31;  Status DC


Oxycodone HCl (Roxicodone) 5 mg PRN Q4HRS  PRN PO PAIN Last administered on 1/17 /17at 13:59;  Start 1/17/17 at 14:00


Losartan Potassium (Cozaar) 50 mg BID PO ;  Start 1/17/17 at 21:00





Active Scripts


Active


Oxycodone-Acetaminophen  (Oxycodone Hcl/Acetaminophen) 1 Each Tablet 1 

Tab PO Q6HRS PRN


Reported


Clonidine Hcl 0.2 Mg Tablet 0.2 Mg PO DAILY


Methocarbamol 750 Mg Tablet 1 Tab PO BID


Bystolic (Nebivolol) 10 Mg Tablet 10 Mg PO DAILY


Amlodipine-Valsartan 5-320 mg (Amlodipine/Valsartan) 1 Each Tablet 1 Tab PO 

DAILY


Gabapentin 300 Mg Capsule 300 Mg PO TID


Vitals/I & O





 Vital Sign - Last 24 Hours








 1/16/17 1/16/17 1/16/17 1/16/17





 18:00 19:00 20:34 23:00


 


Temp  99.1  98.0





  99.1  98.0


 


Pulse  50  57


 


Resp  20 20 20


 


B/P  145/76  124/51


 


Pulse Ox  97  98


 


O2 Delivery Room Air Room Air Room Air Room Air


 


    





    





 1/16/17 1/16/17 1/17/17 1/17/17





 23:03 23:59 00:30 03:00


 


Temp    98.2





    98.2


 


Pulse 50   44


 


Resp  20 20 20


 


B/P 145/76   128/62


 


Pulse Ox    100


 


O2 Delivery  Room Air  Room Air


 


    





    





 1/17/17 1/17/17 1/17/17 1/17/17





 06:00 06:08 07:00 07:02


 


Temp   98.3 





   98.3 


 


Pulse  48 55 48


 


Resp 20  17 


 


B/P  148/88 131/77 148/88


 


Pulse Ox   95 


 


O2 Delivery Room Air  Room Air 


 


    





    





 1/17/17 1/17/17 1/17/17 1/17/17





 07:57 07:58 08:05 08:07


 


Pulse 55 55  


 


B/P 131/77 131/77  


 


O2 Delivery   Room Air Room Air





 1/17/17 1/17/17 1/17/17 1/17/17





 10:38 11:00 13:59 15:00


 


Temp  97.8  98.0





  97.8  98.0


 


Pulse  47  49


 


Resp  18  17


 


B/P  151/81  138/69


 


Pulse Ox  98  97


 


O2 Delivery Room Air Room Air Room Air Room Air


 


    





    





 1/17/17   





 15:01   


 


O2 Delivery Room Air   














 Intake and Output   


 


 1/16/17 1/16/17 1/17/17





 15:00 23:00 07:00


 


Intake Total  780 ml 420 ml


 


Output Total   450 ml


 


Balance  780 ml -30 ml














DAYRON SEBASTIAN MD Jan 17, 2017 17:34

## 2017-01-17 NOTE — CONS
DATE OF CONSULTATION:  2017



REFERRING PROVIDER:  Dr. Meyer.



REASON FOR CONSULTATION:  Sickle cell.



HISTORY OF PRESENT ILLNESS:  The patient is a 51-year-old female who presented

to the hospital with chest pain occurring for the last few days.  She has had

shortness of breath and radiation to her back with some palpitations at that

time.  The chest discomfort has continued.  She reports having a history of

sickle cell disease and beta thalassemia.  She is followed by Dr. Munoz from

[facility].  She states she last saw him a few months ago.  She

states that she has had sickle cell crises previously with her last being in

August.



PAST MEDICAL HISTORY:  Sickle cell, beta thalassemia, hypertension, morbid

obesity, tobacco abuse.



PAST SURGICAL HISTORY:   x 2; right shoulder surgery; uterine biopsy

for cancer, which she reports was negative; Arnold-Chiari malformation surgery

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



FAMILY HISTORY:  Mom  from uterine cancer.  Dad's history is unknown.  She

has a sister with hypothyroidism.



SOCIAL HISTORY:  She is smoking a quarter pack a day for at least 10 years.  She

has not been interested in quitting previously.



ALLERGIES:  MORPHINE, PENICILLIN.



CURRENT MEDICATIONS:  Tylenol, Norvasc, aspirin, clonidine, fentanyl,

hydralazine, losartan, Zofran.



REVIEW OF SYSTEMS:  Ten point review of systems completed and remarkable for the

shortness of breath, back pain, chest pain and palpitations.  Now, the chest

pain continues and the others have resolved.



PHYSICAL EXAMINATION:

VITAL SIGNS:  Temperature 98.3, pulse 55, respiratory rate 17, blood pressure

131/77, 95% O2 on room air.

GENERAL:  She is alert and oriented, morbidly obese and in no apparent distress

at this time.

HEENT:  Extraocular muscle strength is intact.  Mucous membranes are moist.

CARDIOVASCULAR:  Heart is regular in rhythm and rate.

LUNGS:  Clear to auscultation bilaterally.

ABDOMEN:  Soft, nontender.

EXTREMITIES:  No edema.

NEUROLOGIC:  No focal deficits.



IMAGING and LABORATORY DATA:  Hemoglobin 11.6, white blood cell count 11.9,

platelets 261.  On her peripheral smear, there were few sickle cells seen.  BMP

is unremarkable.  Total bilirubin 1.7, direct bilirubin 0.4, troponin 0.045 and

on repeat 0.041.  Chest x-ray was unremarkable.



ASSESSMENT AND PLAN:  The patient is a 51-year-old female with the following

medical problems:

1.  History of sickle cell/beta thalassemia.  I questioned if she truly has

sickle cell trait given that her hemoglobin is very near normal.  She is very

adamant that she does have the disease.  Regardless, she has few sickle cells

present on her peripheral smear at this time and therefore the sickle cell is

not contributing to her chest pain picture.  She follows with Dr. Munoz at

[facility] and can continue following there upon discharge.  Her

outpatient medication list does not reveal Hydrea and I would not start any now.

2.  Chest pain, defer to cardiology.  Sickle cell is not related.



Thank you for this consultation.  Please call with any further questions. 

Discussed with Dr. Garcia.

 



______________________________

CECILIA MARTINEZ DO DR:  EILEEN/brigitte  JOB#:  [id number] / [id number]

DD:  2017 09:02  DT:  2017 23:29

ISIDRA

## 2017-01-17 NOTE — PDOC
CARDIO Progress Notes


Date and Time


Date of Service


1/17/2017


Time of Evaluation


1330





Subjective


Subjective:  No shortness of breath, No Palpitations, No Dizziness, Other (back 

and chest discomfort better)





Vitals


Vitals





 Vital Signs








  Date Time  Temp Pulse Resp B/P Pulse Ox O2 Delivery O2 Flow Rate FiO2


 


1/17/17 11:00 97.8 47 18 151/81 98 Room Air  





 97.8       








Weight


Weight [ ]





Input and Output


Intake and Output











 Intake and Output 


 


 1/17/17





 07:00


 


Intake Total 1200 ml


 


Output Total 450 ml


 


Balance 750 ml


 


 


 


Intake Oral 1200 ml


 


Output Urine Total 450 ml


 


# Voids 2











Laboratory


Labs





Laboratory Tests








Test


  1/16/17


19:30 1/17/17


00:40 1/17/17


09:40


 


Troponin I Quantitative


  0.041ng/mL


(0.000-0.055) 


  0.022ng/mL


(0.000-0.055)


 


Urine Collection Type  Unknown  


 


Urine Color  Yellow  


 


Urine Clarity  Clear  


 


Urine pH  6.0  


 


Urine Specific Gravity  1.010  


 


Urine Protein


  


  Negativemg/dL


(NEG-TRACE) 


 


 


Urine Glucose (UA)


  


  Negativemg/dL


(NEG) 


 


 


Urine Ketones (Stick)


  


  Negativemg/dL


(NEG) 


 


 


Urine Blood  Negative (NEG)  


 


Urine Nitrite  Negative (NEG)  


 


Urine Bilirubin  Negative (NEG)  


 


Urine Urobilinogen Dipstick


  


  4.0mg/dL (0.2


mg/dL) 


 


 


Urine Leukocyte Esterase  Negative (NEG)  


 


Urine RBC  0/HPF (0-2)  


 


Urine WBC  Occ/HPF (0-4)  


 


Urine Squamous Epithelial


Cells 


  Mod/LPF 


  


 


 


Urine Bacteria  0/HPF (0-FEW)  











Physical Exam


HEENT:  Neck Supple W Full Motion


Chest:  Symmetric


LUNGS:  Clear to Auscultation


Heart:  S1S2, RRR (SB)


Abdomen:  Soft N/T


Extremities:  No Edema, No Calf Tenderness


Neurology:  alert, oriented, follow commands





Assessment


Assessment


1. Atypical chest pain: retrosternal reproducible with palpation.Troponin 

series normal. Noncardiac. TTE with normal wall motion, EF, with moderate 

pulmonary HTN.  Likely CP culprit is uncontrolled HTN with MSK component. 





2. Accelerated HTN: due to noncompliance, has been skipping meds.  Was on 

bystolic/clonidine (once daily), and amlodipine low dose. 





3. Asymptomatic sinus bradycardia: Continues to range from 40-50s. lowest rate 

in the 42. Bystolic was not continued as an inpt. 





4. 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  Per hematology





5. Chronic back pain with chronic opioid use. 





6. Tobaccoism





Plan





1. Recommend not resuming bystolic.  Stop clonidine as well. Completely 

discontinue both. No further cardiac workup warranted at this time





2. Continue norvasc at 10 mg daily, increase losartan 50 mg bid.  If becomes 

labile then consider routine po hydralazine. Encourage BP home monitoring and 

compliance with follow ups. 





3. Strongly encouraged smoking cessation and diet adjustment particularly her 

high Na intake. 





4. Continue with home ECASA. 





5. Continue with SC crisis prevention.








CASSIE GONZALEZ APRN Jan 17, 2017 14:10

## 2017-01-18 VITALS — DIASTOLIC BLOOD PRESSURE: 79 MMHG | SYSTOLIC BLOOD PRESSURE: 171 MMHG

## 2017-01-18 VITALS — SYSTOLIC BLOOD PRESSURE: 121 MMHG | DIASTOLIC BLOOD PRESSURE: 55 MMHG

## 2017-01-18 VITALS — SYSTOLIC BLOOD PRESSURE: 145 MMHG | DIASTOLIC BLOOD PRESSURE: 61 MMHG

## 2017-01-18 RX ADMIN — LOSARTAN POTASSIUM SCH MG: 50 TABLET, FILM COATED ORAL at 08:49

## 2017-01-18 RX ADMIN — ASPIRIN SCH MG: 81 TABLET, COATED ORAL at 08:48

## 2017-01-18 RX ADMIN — OXYCODONE HYDROCHLORIDE PRN MG: 5 TABLET ORAL at 08:48

## 2017-01-18 RX ADMIN — OXYCODONE HYDROCHLORIDE PRN MG: 5 TABLET ORAL at 02:37

## 2017-01-18 RX ADMIN — AMLODIPINE BESYLATE SCH MG: 10 TABLET ORAL at 08:49

## 2017-01-18 NOTE — DISCH
DISCHARGE INSTRUCTIONS


Condition on Discharge


Condition on Discharge:  Stable





Activity After Discharge


Activity Instructions for Disc:  No restrictions





Diet after Discharge


Diet after Discharge:  Regular





Contacting the DR. after DC


Call your doctor for:  If your condition worsens





Follow-Up


Follow up with:  PCP in 1-2 weeks








DAYRON SEBASTIAN MD Jan 18, 2017 13:03

## 2017-01-18 NOTE — PDOC
PROGRESS NOTES


Chief Complaint


Chief Complaint


Pain crisis








ASSESSMENT AND PLAN:


1.  Anemia:  NOT SS dz!  appreciate Dr Mead's input.  in discussion with pt, 

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  

discussed with her, that she has sickle cell trait (which does not cause pain 

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 

but never pain crises.  Hgb and retic near normal, speaking against sickle cell 

crisis as well.


2.  back pain:  recurrent acc to patient, joyce  "when i get stressed at work".  

musculoskeletal pain.  discussed regimen with her:  naproxen scheduled and oxy 

PRN


3.  Dispo:  D/C





Vitals


Vitals





 Vital Signs








  Date Time  Temp Pulse Resp B/P Pulse Ox O2 Delivery O2 Flow Rate FiO2


 


1/18/17 11:09 97.7 52 18 145/61 93 Room Air  





 97.7       











Physical Exam


General:  Alert, Oriented X3, Cooperative, No acute distress


Heart:  Regular rate, Normal S1, Normal S2, Other (2/6 systolic murmur to LLS 

border)


Extremities:  No cyanosis, No edema


Skin:  No breakdown, No significant lesion





Labs


LABS


back pain at about 4.  oral meds only





Comment


Review of Relevant


I have reviewed the following items nola (where applicable) has been applied.


Labs





Laboratory Tests








Test


  1/16/17


19:30 1/17/17


00:40 1/17/17


09:40


 


Troponin I Quantitative


  0.041ng/mL


(0.000-0.055) 


  0.022ng/mL


(0.000-0.055)


 


Urine Collection Type  Unknown  


 


Urine Color  Yellow  


 


Urine Clarity  Clear  


 


Urine pH  6.0  


 


Urine Specific Gravity  1.010  


 


Urine Protein


  


  Negativemg/dL


(NEG-TRACE) 


 


 


Urine Glucose (UA)


  


  Negativemg/dL


(NEG) 


 


 


Urine Ketones (Stick)


  


  Negativemg/dL


(NEG) 


 


 


Urine Blood  Negative (NEG)  


 


Urine Nitrite  Negative (NEG)  


 


Urine Bilirubin  Negative (NEG)  


 


Urine Urobilinogen Dipstick


  


  4.0mg/dL (0.2


mg/dL) 


 


 


Urine Leukocyte Esterase  Negative (NEG)  


 


Urine RBC  0/HPF (0-2)  


 


Urine WBC  Occ/HPF (0-4)  


 


Urine Squamous Epithelial


Cells 


  Mod/LPF 


  


 


 


Urine Bacteria  0/HPF (0-FEW)  








Medications





 Current Medications


Aspirin 324 mg 324 mg 1X  ONCE PO  Last administered on 1/16/17at 12:00;  Start 

1/16/17 at 12:00;  Stop 1/16/17 at 12:01;  Status DC


Sodium Chloride (Iv Sodium Chloride 0.9% 1000ml Bag) 1,000 ml @  1,000 mls/hr 

Q1H IV  Last administered on 1/16/17at 11:48;  Start 1/16/17 at 11:48;  Stop 1/ 16/17 at 12:47;  Status DC


Fentanyl Citrate (Fentanyl 2ml Vial) 50 mcg 1X  ONCE IV  Last administered on 1/ 16/17at 12:32;  Start 1/16/17 at 12:00;  Stop 1/16/17 at 12:01;  Status DC


Clonidine HCl (Catapres) 0.1 mg 1X  ONCE PO  Last administered on 1/16/17at 12:

45;  Start 1/16/17 at 12:45;  Stop 1/16/17 at 12:46;  Status DC


Amlodipine Besylate (Norvasc) 5 mg 1X  ONCE PO  Last administered on 1/16/17at 

13:14;  Start 1/16/17 at 12:45;  Stop 1/16/17 at 12:46;  Status DC


Ondansetron HCl (Zofran) 4 mg PRN Q8HRS  PRN IV NAUSEA/VOMITING;  Start 1/16/17 

at 13:45;  Stop 1/17/17 at 13:44;  Status DC


Fentanyl Citrate (Fentanyl 2ml Vial) 50 mcg PRN Q1HR  PRN IV PAIN Last 

administered on 1/17/17at 10:38;  Start 1/16/17 at 13:45;  Stop 1/17/17 at 13:44

;  Status DC


Acetaminophen (Tylenol) 650 mg PRN Q4HRS  PRN PO FEVER Last administered on 1/17 /17at 07:57;  Start 1/16/17 at 13:45;  Stop 1/17/17 at 13:44;  Status DC


Amlodipine Besylate (Norvasc) 10 mg DAILY PO  Last administered on 1/18/17at 08:

49;  Start 1/17/17 at 09:00


Amlodipine Besylate (Norvasc) 5 mg 1X  ONCE PO ;  Start 1/16/17 at 15:00;  Stop 

1/16/17 at 15:04;  Status DC


Losartan Potassium (Cozaar) 50 mg DAILY PO  Last administered on 1/17/17at 07:57

;  Start 1/17/17 at 09:00;  Stop 1/18/17 at 09:00;  Status DC


Clonidine HCl (Catapres) 0.1 mg Q8HRS PO  Last administered on 1/17/17at 06:08;

  Start 1/16/17 at 15:30;  Stop 1/17/17 at 14:11;  Status DC


Hydralazine HCl (Apresoline) 10 mg PRN Q4HRS  PRN IVP ELEVATED BP, SEE COMMENTS

;  Start 1/16/17 at 15:00


Aspirin (Ecotrin) 81 mg DAILYWBKFT PO  Last administered on 1/18/17at 08:48;  

Start 1/17/17 at 08:00


Info (Do NOT chart on this placeholder) 1 each 1X  ONCE MC ;  Start 1/16/17 at 

20:15;  Stop 1/16/17 at 20:16;  Status UNV


Pneumococcal Polyvalent Vaccine (Do NOT chart on this placeholder) 1 each 1X  

ONCE MC ;  Start 1/16/17 at 20:15;  Stop 1/16/17 at 20:16;  Status UNV


Influenza Virus Vaccine Quadrival (Fluarix Quad [id number] Syringe) 0.5 ml ONCE 

ONCE VAX IM ;  Start 1/16/17 at 20:30;  Stop 1/16/17 at 20:31;  Status DC


Oxycodone HCl (Roxicodone) 5 mg PRN Q4HRS  PRN PO PAIN Last administered on 1/18 /17at 08:48;  Start 1/17/17 at 14:00


Losartan Potassium (Cozaar) 50 mg BID PO  Last administered on 1/18/17at 08:49;

  Start 1/17/17 at 21:00


Diphenhydramine HCl (Benadryl) 25 mg PRN Q4HRS  PRN PO ITCHING Last 

administered on 1/18/17at 08:48;  Start 1/18/17 at 06:00





Active Scripts


Active


Oxycodone-Acetaminophen  (Oxycodone Hcl/Acetaminophen) 1 Each Tablet 1 

Tab PO Q6HRS PRN


Reported


Clonidine Hcl 0.2 Mg Tablet 0.2 Mg PO DAILY


Methocarbamol 750 Mg Tablet 1 Tab PO BID


Bystolic (Nebivolol) 10 Mg Tablet 10 Mg PO DAILY


Amlodipine-Valsartan 5-320 mg (Amlodipine/Valsartan) 1 Each Tablet 1 Tab PO 

DAILY


Gabapentin 300 Mg Capsule 300 Mg PO TID


Vitals/I & O





 Vital Sign - Last 24 Hours








 1/17/17 1/17/17 1/17/17 1/17/17





 13:59 15:00 18:18 19:00


 


Temp  98.0  98.1





  98.0  98.1


 


Pulse  49  59


 


Resp  17  18


 


B/P  138/69  164/68


 


Pulse Ox  97  94


 


O2 Delivery Room Air Room Air Room Air Room Air


 


    





    





 1/17/17 1/17/17 1/17/17 1/17/17





 20:00 20:37 22:34 23:00


 


Temp    97.9





    97.9


 


Pulse  59  56


 


Resp    18


 


B/P  164/68  133/64


 


Pulse Ox   97 94


 


O2 Delivery Room Air  Room Air Room Air


 


    





    





 1/18/17 1/18/17 1/18/17 1/18/17





 02:37 03:00 03:37 07:00


 


Temp  97.9  97.5





  97.9  97.5


 


Pulse  57  52


 


Resp  18  18


 


B/P  121/55  171/79


 


Pulse Ox 94 92 92 98


 


O2 Delivery Room Air Room Air Room Air Room Air


 


    





    





 1/18/17 1/18/17 1/18/17 1/18/17





 08:00 08:48 08:49 08:49


 


Pulse   52 52


 


B/P   171/79 171/79


 


O2 Delivery Room Air Room Air  





 1/18/17   





 11:09   


 


Temp 97.7   





 97.7   


 


Pulse 52   


 


Resp 18   


 


B/P 145/61   


 


Pulse Ox 93   


 


O2 Delivery Room Air   














 Intake and Output   


 


 1/17/17 1/17/17 1/18/17





 15:00 23:00 07:00


 


Intake Total   840 ml


 


Output Total 350 ml  500 ml


 


Balance -350 ml  340 ml














DAYRON SEBASTIAN MD Jan 18, 2017 11:29

## 2017-01-20 NOTE — DS
DATE OF DISCHARGE:  01/18/2017



CHIEF COMPLAINT:  Pain crisis.



HISTORY OF PRESENT ILLNESS:  The patient is a 51-year-old  woman who

claimed to have sickle cell disease presenting with pain crisis.  She was

actually evaluated by Dr. Mead and with almost normal hemoglobin, sickle cell

anemia was put into question.  She apparently had had previous workup at  with

hemoglobin electrophoresis revealing minor component of hemoglobin S, otherwise

normal hemoglobins.  The patient in the past apparently had been told she had

sickle cell/thalassemia.  The patient was explained that the sickle cell crisis

would not occur in her constellation with a completely normal hemoglobin as well

as only minimally elevated reticulocyte count.  Pain would more than likely be

musculoskeletal.  She was therefore treated with p.o. narcotics and deemed

stable for discharge on 1/18.



PHYSICAL EXAMINATION:  Please refer to note from same day.



DISCHARGE DATE:  01/18/2017



DISCHARGE DISPOSITION:  To home.



DISCHARGE CONDITION:  Improved.



DISCHARGE DIAGNOSES:

1.  Musculoskeletal back pain.

2.  No sickle cell anemia!



DISCHARGE MEDICATIONS:  Please refer to MAR.



DISCHARGE INSTRUCTIONS:  The patient will follow up with primary care physician

in 1 week.

 



______________________________

DAYRON SEBASTIAN MD



DR:  JEN/nts  JOB#:  [id number] / [id number]

DD:  01/20/2017 00:48  DT:  01/20/2017 02:55

ISIDRA

## 2017-06-06 ENCOUNTER — HOSPITAL ENCOUNTER (EMERGENCY)
Dept: HOSPITAL 61 - ER | Age: 52
Discharge: HOME | End: 2017-06-06
Payer: COMMERCIAL

## 2017-06-06 VITALS — WEIGHT: 210 LBS | HEIGHT: 66 IN | BODY MASS INDEX: 33.75 KG/M2

## 2017-06-06 VITALS — DIASTOLIC BLOOD PRESSURE: 91 MMHG | SYSTOLIC BLOOD PRESSURE: 192 MMHG

## 2017-06-06 DIAGNOSIS — I10: ICD-10-CM

## 2017-06-06 DIAGNOSIS — Z90.81: ICD-10-CM

## 2017-06-06 DIAGNOSIS — Z88.5: ICD-10-CM

## 2017-06-06 DIAGNOSIS — S13.9XXA: ICD-10-CM
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
## 2017-06-06 DIAGNOSIS — S42.295A: Primary | ICD-10-CM

## 2017-06-06 DIAGNOSIS — Z86.2: ICD-10-CM

## 2017-06-06 DIAGNOSIS — Z98.890: ICD-10-CM
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
## 2017-06-06 DIAGNOSIS — T38.0X5A: ICD-10-CM
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
## 2017-06-06 DIAGNOSIS — Z88.0: ICD-10-CM

## 2017-06-06 LAB
ANION GAP SERPL CALC-SCNC: 7 MMOL/L (ref 6–14)
APTT BLD: 29 SEC (ref 24–38)
BACTERIA #/AREA URNS HPF: (no result) /HPF
BARBITURATES UR-MCNC: (no result) UG/ML
BASOPHILS # BLD AUTO: 0.1 X10^3/UL (ref 0–0.2)
BASOPHILS NFR BLD: 1 % (ref 0–3)
BENZODIAZ UR-MCNC: (no result) UG/L
BILIRUB UR QL STRIP: NEGATIVE
BUN SERPL-MCNC: 10 MG/DL (ref 7–20)
CALCIUM SERPL-MCNC: 8.8 MG/DL (ref 8.5–10.1)
CANNABINOIDS UR-MCNC: (no result) UG/L
CHLORIDE SERPL-SCNC: 108 MMOL/L (ref 98–107)
CO2 SERPL-SCNC: 28 MMOL/L (ref 21–32)
COCAINE UR-MCNC: (no result) NG/ML
CREAT SERPL-MCNC: 0.9 MG/DL (ref 0.6–1)
EOSINOPHIL NFR BLD: 3 % (ref 0–3)
ERYTHROCYTE [DISTWIDTH] IN BLOOD BY AUTOMATED COUNT: 16 % (ref 11.5–14.5)
GFR SERPLBLD BASED ON 1.73 SQ M-ARVRAT: 79.9 ML/MIN
GLUCOSE SERPL-MCNC: 93 MG/DL (ref 70–99)
GLUCOSE UR STRIP-MCNC: NEGATIVE MG/DL
HCT VFR BLD CALC: 34.3 % (ref 36–47)
HGB BLD-MCNC: 11.1 G/DL (ref 12–15.5)
HYPOCHROMIA BLD QL SMEAR: SLIGHT
INR PPP: 1.1 (ref 0.8–1.1)
LYMPHOCYTES # BLD: 3.3 X10^3/UL (ref 1–4.8)
LYMPHOCYTES NFR BLD AUTO: 26 % (ref 24–48)
MCH RBC QN AUTO: 25 PG (ref 25–35)
MCHC RBC AUTO-ENTMCNC: 32 G/DL (ref 31–37)
MCV RBC AUTO: 76 FL (ref 79–100)
METHADONE SERPL-MCNC: (no result) NG/ML
MONOCYTES NFR BLD: 9 % (ref 0–9)
NEUTROPHILS NFR BLD AUTO: 62 % (ref 31–73)
NITRITE UR QL STRIP: NEGATIVE
OPIATES UR-MCNC: (no result) NG/ML
PCP SERPL-MCNC: (no result) MG/DL
PH UR STRIP: 6 [PH]
PLATELET # BLD AUTO: 283 X10^3/UL (ref 140–400)
PLATELET # BLD EST: ADEQUATE 10*3/UL
POIKILOCYTOSIS BLD QL SMEAR: SLIGHT
POLYCHROMASIA BLD QL SMEAR: SLIGHT
POTASSIUM SERPL-SCNC: 3.4 MMOL/L (ref 3.5–5.1)
PROT UR STRIP-MCNC: NEGATIVE MG/DL
PROTHROMBIN TIME: 13.6 SEC (ref 11.7–14)
RBC # BLD AUTO: 4.51 X10^6/UL (ref 3.5–5.4)
RBC #/AREA URNS HPF: 0 /HPF (ref 0–2)
SODIUM SERPL-SCNC: 143 MMOL/L (ref 136–145)
SP GR UR STRIP: 1.01
SQUAMOUS #/AREA URNS LPF: (no result) /LPF
TARGETS BLD QL SMEAR: (no result)
UROBILINOGEN UR-MCNC: 1 MG/DL
WBC # BLD AUTO: 12.8 X10^3/UL (ref 4–11)
WBC #/AREA URNS HPF: 0 /HPF (ref 0–4)

## 2017-06-06 PROCEDURE — 74176 CT ABD & PELVIS W/O CONTRAST: CPT

## 2017-06-06 PROCEDURE — 85027 COMPLETE CBC AUTOMATED: CPT

## 2017-06-06 PROCEDURE — 80048 BASIC METABOLIC PNL TOTAL CA: CPT

## 2017-06-06 PROCEDURE — 96360 HYDRATION IV INFUSION INIT: CPT

## 2017-06-06 PROCEDURE — 99285 EMERGENCY DEPT VISIT HI MDM: CPT

## 2017-06-06 PROCEDURE — 84484 ASSAY OF TROPONIN QUANT: CPT

## 2017-06-06 PROCEDURE — 81001 URINALYSIS AUTO W/SCOPE: CPT

## 2017-06-06 PROCEDURE — 83605 ASSAY OF LACTIC ACID: CPT

## 2017-06-06 PROCEDURE — 86901 BLOOD TYPING SEROLOGIC RH(D): CPT

## 2017-06-06 PROCEDURE — 86850 RBC ANTIBODY SCREEN: CPT

## 2017-06-06 PROCEDURE — 73030 X-RAY EXAM OF SHOULDER: CPT

## 2017-06-06 PROCEDURE — 85610 PROTHROMBIN TIME: CPT

## 2017-06-06 PROCEDURE — 71020: CPT

## 2017-06-06 PROCEDURE — 71250 CT THORAX DX C-: CPT

## 2017-06-06 PROCEDURE — 80305 DRUG TEST PRSMV DIR OPT OBS: CPT

## 2017-06-06 PROCEDURE — 72125 CT NECK SPINE W/O DYE: CPT

## 2017-06-06 PROCEDURE — 36415 COLL VENOUS BLD VENIPUNCTURE: CPT

## 2017-06-06 PROCEDURE — 93005 ELECTROCARDIOGRAM TRACING: CPT

## 2017-06-06 PROCEDURE — 86900 BLOOD TYPING SEROLOGIC ABO: CPT

## 2017-06-06 PROCEDURE — 80320 DRUG SCREEN QUANTALCOHOLS: CPT

## 2017-06-06 PROCEDURE — 85007 BL SMEAR W/DIFF WBC COUNT: CPT

## 2017-06-06 PROCEDURE — 85730 THROMBOPLASTIN TIME PARTIAL: CPT
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
## 2017-06-07 NOTE — EKG
[facility]

               [address] [place] [address]

Test Date:    2017               Test Time:    19:21:52

Pat Name:     ROSENDA GREEN           Department:   

Patient ID:   [id number]           Room:          

Gender:       F                        Technician:   

:          1965               Requested By: PENNIE JACOBSON

Order Number: [id number]            Reading MD:   Marilou Lantigua

                                 Measurements

Intervals                              Axis          

Rate:         51                       P:            64

MD:           128                      QRS:          -45

QRSD:         88                       T:            13

QT:           440                                    

QTc:          407                                    

                           Interpretive Statements

SINUS RHYTHM

NON SPECIFIC ST T WAVE CHANGES



Electronically Signed On 6- 18:28:02 CDT by Marilou Lantigua

## 2017-09-08 ENCOUNTER — HOSPITAL ENCOUNTER (EMERGENCY)
Dept: HOSPITAL 61 - ER | Age: 52
Discharge: HOME | End: 2017-09-08
Payer: COMMERCIAL

## 2017-09-08 VITALS — DIASTOLIC BLOOD PRESSURE: 62 MMHG | SYSTOLIC BLOOD PRESSURE: 122 MMHG

## 2017-09-08 DIAGNOSIS — D57.00: Primary | ICD-10-CM

## 2017-09-08 DIAGNOSIS — Z88.5: ICD-10-CM

## 2017-09-08 DIAGNOSIS — Z88.0: ICD-10-CM

## 2017-09-08 DIAGNOSIS — I10: ICD-10-CM

## 2017-09-08 LAB
ALBUMIN SERPL-MCNC: 3.6 G/DL (ref 3.4–5)
ALBUMIN/GLOB SERPL: 1 {RATIO} (ref 1–1.7)
ALP SERPL-CCNC: 89 U/L (ref 46–116)
ALT SERPL-CCNC: 32 U/L (ref 14–59)
ANION GAP SERPL CALC-SCNC: 7 MMOL/L (ref 6–14)
AST SERPL-CCNC: 23 U/L (ref 15–37)
BASOPHILS # BLD AUTO: 0.1 X10^3/UL (ref 0–0.2)
BASOPHILS NFR BLD: 1 % (ref 0–3)
BILIRUB SERPL-MCNC: 1.9 MG/DL (ref 0.2–1)
BUN SERPL-MCNC: 6 MG/DL (ref 7–20)
BUN/CREAT SERPL: 6 (ref 6–20)
CALCIUM SERPL-MCNC: 8.6 MG/DL (ref 8.5–10.1)
CHLORIDE SERPL-SCNC: 107 MMOL/L (ref 98–107)
CO2 SERPL-SCNC: 29 MMOL/L (ref 21–32)
CREAT SERPL-MCNC: 1 MG/DL (ref 0.6–1)
EOSINOPHIL NFR BLD: 4 % (ref 0–3)
ERYTHROCYTE [DISTWIDTH] IN BLOOD BY AUTOMATED COUNT: 17.7 % (ref 11.5–14.5)
GFR SERPLBLD BASED ON 1.73 SQ M-ARVRAT: 70.7 ML/MIN
GLOBULIN SER-MCNC: 3.6 G/DL (ref 2.2–3.8)
GLUCOSE SERPL-MCNC: 88 MG/DL (ref 70–99)
HCT VFR BLD CALC: 32.4 % (ref 36–47)
HGB BLD-MCNC: 10.5 G/DL (ref 12–15.5)
LYMPHOCYTES # BLD: 2.7 X10^3/UL (ref 1–4.8)
LYMPHOCYTES NFR BLD AUTO: 26 % (ref 24–48)
MCH RBC QN AUTO: 25 PG (ref 25–35)
MCHC RBC AUTO-ENTMCNC: 32 G/DL (ref 31–37)
MCV RBC AUTO: 76 FL (ref 79–100)
MONOCYTES NFR BLD: 8 % (ref 0–9)
NEUTROPHILS NFR BLD AUTO: 62 % (ref 31–73)
PLATELET # BLD AUTO: 207 X10^3/UL (ref 140–400)
POTASSIUM SERPL-SCNC: 4.3 MMOL/L (ref 3.5–5.1)
PROT SERPL-MCNC: 7.2 G/DL (ref 6.4–8.2)
RBC # BLD AUTO: 4.26 X10^6/UL (ref 3.5–5.4)
RETICS/RBC NFR AUTO: 3.5 % (ref 0.5–2.5)
SODIUM SERPL-SCNC: 143 MMOL/L (ref 136–145)
WBC # BLD AUTO: 10.3 X10^3/UL (ref 4–11)

## 2017-09-08 PROCEDURE — 36415 COLL VENOUS BLD VENIPUNCTURE: CPT

## 2017-09-08 PROCEDURE — 80053 COMPREHEN METABOLIC PANEL: CPT

## 2017-09-08 PROCEDURE — 96375 TX/PRO/DX INJ NEW DRUG ADDON: CPT

## 2017-09-08 PROCEDURE — 96374 THER/PROPH/DIAG INJ IV PUSH: CPT

## 2017-09-08 PROCEDURE — 85025 COMPLETE CBC W/AUTO DIFF WBC: CPT

## 2017-09-08 PROCEDURE — 99284 EMERGENCY DEPT VISIT MOD MDM: CPT

## 2017-09-08 PROCEDURE — 96376 TX/PRO/DX INJ SAME DRUG ADON: CPT

## 2017-09-08 PROCEDURE — 85045 AUTOMATED RETICULOCYTE COUNT: CPT

## 2017-09-08 RX ADMIN — HYDROMORPHONE HYDROCHLORIDE PRN MG: 2 INJECTION INTRAMUSCULAR; INTRAVENOUS; SUBCUTANEOUS at 12:41

## 2017-09-08 RX ADMIN — HYDROMORPHONE HYDROCHLORIDE PRN MG: 2 INJECTION INTRAMUSCULAR; INTRAVENOUS; SUBCUTANEOUS at 13:47

## 2018-01-15 ENCOUNTER — HOSPITAL ENCOUNTER (INPATIENT)
Dept: HOSPITAL 61 - ER | Age: 53
LOS: 4 days | Discharge: HOME | DRG: 280 | End: 2018-01-19
Attending: INTERNAL MEDICINE | Admitting: INTERNAL MEDICINE
Payer: COMMERCIAL

## 2018-01-15 DIAGNOSIS — Z88.5: ICD-10-CM

## 2018-01-15 DIAGNOSIS — D57.00: ICD-10-CM

## 2018-01-15 DIAGNOSIS — Z88.0: ICD-10-CM

## 2018-01-15 DIAGNOSIS — Z91.19: ICD-10-CM

## 2018-01-15 DIAGNOSIS — I25.10: ICD-10-CM

## 2018-01-15 DIAGNOSIS — N18.5: ICD-10-CM

## 2018-01-15 DIAGNOSIS — I21.4: Primary | ICD-10-CM

## 2018-01-15 DIAGNOSIS — I12.0: ICD-10-CM

## 2018-01-15 DIAGNOSIS — Z83.3: ICD-10-CM

## 2018-01-15 DIAGNOSIS — N39.0: ICD-10-CM

## 2018-01-15 DIAGNOSIS — D57.419: ICD-10-CM

## 2018-01-15 DIAGNOSIS — N17.0: ICD-10-CM

## 2018-01-15 DIAGNOSIS — Z98.51: ICD-10-CM

## 2018-01-15 DIAGNOSIS — Z90.81: ICD-10-CM

## 2018-01-15 LAB
ADD MAN DIFF?: NO
ALBUMIN SERPL-MCNC: 3.4 G/DL (ref 3.4–5)
ALP SERPL-CCNC: 91 U/L (ref 46–116)
ALT (SGPT): 23 U/L (ref 14–59)
AMPHETAMINE/METHAMPHETAMINE: (no result)
ANION GAP SERPL CALC-SCNC: 9 MMOL/L (ref 6–14)
AST SERPL-CCNC: 28 U/L (ref 15–37)
BACTERIA,URINE: (no result) /HPF
BARBITURATES: (no result)
BASO #: 0.1 X10^3/UL (ref 0–0.2)
BASO %: 1 % (ref 0–3)
BENZODIAZEPINES: (no result)
BILIRUB DIRECT SERPL-MCNC: 0.4 MG/DL (ref 0–0.2)
BILIRUBIN,URINE: NEGATIVE
BLOOD UREA NITROGEN: 10 MG/DL (ref 7–20)
CALCIUM: 8.2 MG/DL (ref 8.5–10.1)
CANNABINOIDS: (no result)
CHLORIDE: 108 MMOL/L (ref 98–107)
CK SERPL-CCNC: 104 U/L (ref 26–192)
CKMB INDEX: 6.2 % (ref 0–4)
CKMB MASS: 6.4 NG/ML (ref 0–3.6)
CLARITY,URINE: CLEAR
CO2 SERPL-SCNC: 27 MMOL/L (ref 21–32)
COCAINE: (no result)
COLOR,URINE: YELLOW
CREAT SERPL-MCNC: 0.8 MG/DL (ref 0.6–1)
EOS #: 0.3 X10^3/UL (ref 0–0.7)
EOS %: 3 % (ref 0–3)
ETHANOL, URINE: (no result)
GFR SERPLBLD BASED ON 1.73 SQ M-ARVRAT: 91.5 ML/MIN
GLUCOSE SERPL-MCNC: 88 MG/DL (ref 70–99)
GLUCOSE,URINE: NEGATIVE MG/DL
HAPTOGLOBIN: <10 MG/DL (ref 34–200)
HCG SERPL-ACNC: 12.4 X10^3/UL (ref 4–11)
HEMATOCRIT: 34.4 % (ref 36–47)
HEMOGLOBIN: 11 G/DL (ref 12–15.5)
INFLUENZA A PATIENT: NEGATIVE
INFLUENZA B PATIENT: NEGATIVE
INR: 1 (ref 0.8–1.1)
LDH SERPL L TO P-CCNC: 364 U/L (ref 81–234)
LIPASE: 104 U/L (ref 73–393)
LYMPH #: 1.8 X10^3/UL (ref 1–4.8)
LYMPH %: 15 % (ref 24–48)
MAGNESIUM: 1.9 MG/DL (ref 1.8–2.4)
MEAN CORPUSCULAR HEMOGLOBIN: 24 PG (ref 25–35)
MEAN CORPUSCULAR HGB CONC: 32 G/DL (ref 31–37)
MEAN CORPUSCULAR VOLUME: 76 FL (ref 79–100)
METHADONE: (no result)
MONO #: 1 X10^3/UL (ref 0–1.1)
MONO %: 8 % (ref 0–9)
NEUT #: 9.2 X10^3UL (ref 1.8–7.7)
NEUT %: 74 % (ref 31–73)
NITRITE,URINE: NEGATIVE
NT-PRO BNP: 463 PG/ML (ref 0–124)
OBC FLU: (no result)
OPIATES: (no result)
PH,URINE: 5.5
PHENCYCLIDINE: (no result)
PLATELET COUNT: 279 X10^3/UL (ref 140–400)
POTASSIUM SERPL-SCNC: 4.3 MMOL/L (ref 3.5–5.1)
PROTEIN,URINE: NEGATIVE MG/DL
PROTHROMBIN TIME PATIENT: 12.8 SEC (ref 11.7–14)
RBC,URINE: 0 /HPF (ref 0–2)
RED BLOOD COUNT: 4.5 X10^6/UL (ref 3.5–5.4)
RED CELL DISTRIBUTION WIDTH: 16.7 % (ref 11.5–14.5)
RETIC COUNT: 4.2 % (ref 0.5–2.5)
SODIUM: 144 MMOL/L (ref 136–145)
SPECIFIC GRAVITY,URINE: 1.01
SQUAMOUS EPITHELIAL CELL,UR: (no result) /LPF
THYROID STIM HORMONE (TSH): 2 UIU/ML (ref 0.36–3.74)
TOTAL BILIRUBIN: 1.7 MG/DL (ref 0.2–1)
TOTAL PROTEIN: 7.2 G/DL (ref 6.4–8.2)
TROPONINI: 0.89 NG/ML (ref 0–0.06)
TROPONINI: 1.21 NG/ML (ref 0–0.06)
TROPONINI: 1.22 NG/ML (ref 0–0.06)
UROBILINOGEN,URINE: 1 MG/DL
WBC,URINE: >40 /HPF (ref 0–4)

## 2018-01-15 PROCEDURE — 83020 HEMOGLOBIN ELECTROPHORESIS: CPT

## 2018-01-15 PROCEDURE — 84300 ASSAY OF URINE SODIUM: CPT

## 2018-01-15 PROCEDURE — 84484 ASSAY OF TROPONIN QUANT: CPT

## 2018-01-15 PROCEDURE — 81001 URINALYSIS AUTO W/SCOPE: CPT

## 2018-01-15 PROCEDURE — 80307 DRUG TEST PRSMV CHEM ANLYZR: CPT

## 2018-01-15 PROCEDURE — 93005 ELECTROCARDIOGRAM TRACING: CPT

## 2018-01-15 PROCEDURE — 85025 COMPLETE CBC W/AUTO DIFF WBC: CPT

## 2018-01-15 PROCEDURE — 87086 URINE CULTURE/COLONY COUNT: CPT

## 2018-01-15 PROCEDURE — 93017 CV STRESS TEST TRACING ONLY: CPT

## 2018-01-15 PROCEDURE — 84443 ASSAY THYROID STIM HORMONE: CPT

## 2018-01-15 PROCEDURE — 87186 SC STD MICRODIL/AGAR DIL: CPT

## 2018-01-15 PROCEDURE — 83735 ASSAY OF MAGNESIUM: CPT

## 2018-01-15 PROCEDURE — 87804 INFLUENZA ASSAY W/OPTIC: CPT

## 2018-01-15 PROCEDURE — 85007 BL SMEAR W/DIFF WBC COUNT: CPT

## 2018-01-15 PROCEDURE — 80076 HEPATIC FUNCTION PANEL: CPT

## 2018-01-15 PROCEDURE — 80053 COMPREHEN METABOLIC PANEL: CPT

## 2018-01-15 PROCEDURE — 96376 TX/PRO/DX INJ SAME DRUG ADON: CPT

## 2018-01-15 PROCEDURE — 78452 HT MUSCLE IMAGE SPECT MULT: CPT

## 2018-01-15 PROCEDURE — 82553 CREATINE MB FRACTION: CPT

## 2018-01-15 PROCEDURE — 74176 CT ABD & PELVIS W/O CONTRAST: CPT

## 2018-01-15 PROCEDURE — 80048 BASIC METABOLIC PNL TOTAL CA: CPT

## 2018-01-15 PROCEDURE — 83010 ASSAY OF HAPTOGLOBIN QUANT: CPT

## 2018-01-15 PROCEDURE — 71250 CT THORAX DX C-: CPT

## 2018-01-15 PROCEDURE — 83615 LACTATE (LD) (LDH) ENZYME: CPT

## 2018-01-15 PROCEDURE — 83690 ASSAY OF LIPASE: CPT

## 2018-01-15 PROCEDURE — 83880 ASSAY OF NATRIURETIC PEPTIDE: CPT

## 2018-01-15 PROCEDURE — 96361 HYDRATE IV INFUSION ADD-ON: CPT

## 2018-01-15 PROCEDURE — 93306 TTE W/DOPPLER COMPLETE: CPT

## 2018-01-15 PROCEDURE — 96374 THER/PROPH/DIAG INJ IV PUSH: CPT

## 2018-01-15 PROCEDURE — 99285 EMERGENCY DEPT VISIT HI MDM: CPT

## 2018-01-15 PROCEDURE — 36415 COLL VENOUS BLD VENIPUNCTURE: CPT

## 2018-01-15 PROCEDURE — 71045 X-RAY EXAM CHEST 1 VIEW: CPT

## 2018-01-15 PROCEDURE — 84550 ASSAY OF BLOOD/URIC ACID: CPT

## 2018-01-15 PROCEDURE — 96375 TX/PRO/DX INJ NEW DRUG ADDON: CPT

## 2018-01-15 PROCEDURE — 85045 AUTOMATED RETICULOCYTE COUNT: CPT

## 2018-01-15 PROCEDURE — 85610 PROTHROMBIN TIME: CPT

## 2018-01-15 RX ADMIN — NITROGLYCERIN 1 MG: 0.4 TABLET SUBLINGUAL at 08:02

## 2018-01-15 RX ADMIN — HYDROMORPHONE HYDROCHLORIDE 1 MG: 2 INJECTION INTRAMUSCULAR; INTRAVENOUS; SUBCUTANEOUS at 19:30

## 2018-01-15 RX ADMIN — HYDROMORPHONE HYDROCHLORIDE 1 MG: 2 INJECTION INTRAMUSCULAR; INTRAVENOUS; SUBCUTANEOUS at 18:29

## 2018-01-15 RX ADMIN — ONDANSETRON 1 MG: 2 INJECTION INTRAMUSCULAR; INTRAVENOUS at 20:43

## 2018-01-15 RX ADMIN — HYDROMORPHONE HYDROCHLORIDE 1 MG: 2 INJECTION INTRAMUSCULAR; INTRAVENOUS; SUBCUTANEOUS at 16:15

## 2018-01-15 RX ADMIN — BACITRACIN 1 MLS/HR: 5000 INJECTION, POWDER, FOR SOLUTION INTRAMUSCULAR at 08:03

## 2018-01-15 RX ADMIN — ASPIRIN 81 MG 1 MG: 81 TABLET ORAL at 10:53

## 2018-01-15 RX ADMIN — ATORVASTATIN CALCIUM 1 MG: 40 TABLET, FILM COATED ORAL at 20:44

## 2018-01-15 RX ADMIN — HYDROMORPHONE HYDROCHLORIDE 1 MG: 2 INJECTION INTRAMUSCULAR; INTRAVENOUS; SUBCUTANEOUS at 22:16

## 2018-01-15 RX ADMIN — HYDROMORPHONE HYDROCHLORIDE 1 MG: 2 INJECTION INTRAMUSCULAR; INTRAVENOUS; SUBCUTANEOUS at 09:49

## 2018-01-15 RX ADMIN — FENTANYL CITRATE 1 MCG: 50 INJECTION INTRAMUSCULAR; INTRAVENOUS at 08:31

## 2018-01-15 RX ADMIN — FENTANYL CITRATE 1 MCG: 50 INJECTION INTRAMUSCULAR; INTRAVENOUS at 08:02

## 2018-01-15 RX ADMIN — HYDROMORPHONE HYDROCHLORIDE 1 MG: 2 INJECTION INTRAMUSCULAR; INTRAVENOUS; SUBCUTANEOUS at 10:49

## 2018-01-15 RX ADMIN — FENTANYL CITRATE 1 MCG: 50 INJECTION INTRAMUSCULAR; INTRAVENOUS at 09:38

## 2018-01-15 RX ADMIN — HYDROMORPHONE HYDROCHLORIDE 1 MG: 2 INJECTION INTRAMUSCULAR; INTRAVENOUS; SUBCUTANEOUS at 12:06

## 2018-01-15 RX ADMIN — ENOXAPARIN SODIUM 1 MG: 100 INJECTION SUBCUTANEOUS at 19:29

## 2018-01-15 RX ADMIN — ONDANSETRON 1 MG: 2 INJECTION INTRAMUSCULAR; INTRAVENOUS at 07:59

## 2018-01-16 LAB
% BANDS: 3 % (ref 0–9)
% LYMPHS: 8 % (ref 24–48)
% METAS: 1 % (ref 0–0)
% MONOS: 13 % (ref 0–10)
% SEGS: 75 % (ref 35–66)
ADD MAN DIFF?: YES
ANION GAP SERPL CALC-SCNC: 10 MMOL/L (ref 6–14)
ANION GAP SERPL CALC-SCNC: 12 MMOL/L (ref 6–14)
ANISOCYTOSIS: SLIGHT
BACTERIA,URINE: (no result) /HPF
BASO #: 0.1 X10^3/UL (ref 0–0.2)
BASO %: 1 % (ref 0–3)
BILIRUBIN,URINE: NEGATIVE
BLOOD UREA NITROGEN: 22 MG/DL (ref 7–20)
BLOOD UREA NITROGEN: 25 MG/DL (ref 7–20)
CALCIUM: 8.1 MG/DL (ref 8.5–10.1)
CALCIUM: 8.5 MG/DL (ref 8.5–10.1)
CHLORIDE: 103 MMOL/L (ref 98–107)
CHLORIDE: 104 MMOL/L (ref 98–107)
CLARITY,URINE: CLEAR
CO2 SERPL-SCNC: 25 MMOL/L (ref 21–32)
CO2 SERPL-SCNC: 25 MMOL/L (ref 21–32)
COLOR,URINE: YELLOW
CREAT SERPL-MCNC: 1.9 MG/DL (ref 0.6–1)
CREAT SERPL-MCNC: 2.4 MG/DL (ref 0.6–1)
EOS #: 0 X10^3/UL (ref 0–0.7)
EOS %: 0 % (ref 0–3)
GFR SERPLBLD BASED ON 1.73 SQ M-ARVRAT: 25.8 ML/MIN
GFR SERPLBLD BASED ON 1.73 SQ M-ARVRAT: 33.7 ML/MIN
GLUCOSE SERPL-MCNC: 88 MG/DL (ref 70–99)
GLUCOSE SERPL-MCNC: 94 MG/DL (ref 70–99)
GLUCOSE,URINE: NEGATIVE MG/DL
HCG SERPL-ACNC: 20.5 X10^3/UL (ref 4–11)
HEMATOCRIT: 32 % (ref 36–47)
HEMOGLOBIN: 10.3 G/DL (ref 12–15.5)
LYMPH #: 1.9 X10^3/UL (ref 1–4.8)
LYMPH %: 9 % (ref 24–48)
MEAN CORPUSCULAR HEMOGLOBIN: 24 PG (ref 25–35)
MEAN CORPUSCULAR HGB CONC: 32 G/DL (ref 31–37)
MEAN CORPUSCULAR VOLUME: 75 FL (ref 79–100)
MONO #: 1.5 X10^3/UL (ref 0–1.1)
MONO %: 7 % (ref 0–9)
NEUT #: 17 X10^3UL (ref 1.8–7.7)
NEUT %: 83 % (ref 31–73)
NITRITE,URINE: POSITIVE
NUCLEATED RBC: 1
PH,URINE: 5.5
PLATELET COUNT: 185 X10^3/UL (ref 140–400)
PLT ESTIMATE: ADEQUATE
POLYCHROMASIA: SLIGHT
POTASSIUM SERPL-SCNC: 4.5 MMOL/L (ref 3.5–5.1)
POTASSIUM SERPL-SCNC: 5 MMOL/L (ref 3.5–5.1)
PROTEIN,URINE: NEGATIVE MG/DL
RBC,URINE: (no result) /HPF (ref 0–2)
RED BLOOD COUNT: 4.26 X10^6/UL (ref 3.5–5.4)
RED CELL DISTRIBUTION WIDTH: 18.3 % (ref 11.5–14.5)
SICKLE CELLS: PRESENT
SODIUM: 139 MMOL/L (ref 136–145)
SODIUM: 140 MMOL/L (ref 136–145)
SPECIFIC GRAVITY,URINE: 1.01
SQUAMOUS EPITHELIAL CELL,UR: (no result) /LPF
TARGET CELLS: PRESENT
TROPONINI: 0.75 NG/ML (ref 0–0.06)
UROBILINOGEN,URINE: 0.2 MG/DL
WBC,URINE: (no result) /HPF (ref 0–4)

## 2018-01-16 RX ADMIN — BACITRACIN 1 MLS/HR: 5000 INJECTION, POWDER, FOR SOLUTION INTRAMUSCULAR at 15:19

## 2018-01-16 RX ADMIN — Medication 1 CAP: at 20:36

## 2018-01-16 RX ADMIN — GABAPENTIN 1 MG: 300 CAPSULE ORAL at 16:49

## 2018-01-16 RX ADMIN — HYDROMORPHONE HYDROCHLORIDE 1 MG: 2 INJECTION INTRAMUSCULAR; INTRAVENOUS; SUBCUTANEOUS at 15:19

## 2018-01-16 RX ADMIN — HYDROMORPHONE HYDROCHLORIDE 1 MG: 2 INJECTION INTRAMUSCULAR; INTRAVENOUS; SUBCUTANEOUS at 22:10

## 2018-01-16 RX ADMIN — ATORVASTATIN CALCIUM 1 MG: 40 TABLET, FILM COATED ORAL at 20:36

## 2018-01-16 RX ADMIN — ASPIRIN 81 MG 1 MG: 81 TABLET ORAL at 15:20

## 2018-01-16 RX ADMIN — HYDROMORPHONE HYDROCHLORIDE 1 MG: 2 INJECTION INTRAMUSCULAR; INTRAVENOUS; SUBCUTANEOUS at 00:06

## 2018-01-16 RX ADMIN — HYDROMORPHONE HYDROCHLORIDE 1 MG: 2 INJECTION INTRAMUSCULAR; INTRAVENOUS; SUBCUTANEOUS at 11:22

## 2018-01-16 RX ADMIN — GABAPENTIN 1 MG: 300 CAPSULE ORAL at 20:36

## 2018-01-16 RX ADMIN — BACITRACIN 1 MLS/HR: 5000 INJECTION, POWDER, FOR SOLUTION INTRAMUSCULAR at 10:05

## 2018-01-16 RX ADMIN — BACITRACIN 1 MLS/HR: 5000 INJECTION, POWDER, FOR SOLUTION INTRAMUSCULAR at 20:36

## 2018-01-16 RX ADMIN — HYDROMORPHONE HYDROCHLORIDE 1 MG: 2 INJECTION INTRAMUSCULAR; INTRAVENOUS; SUBCUTANEOUS at 19:38

## 2018-01-16 RX ADMIN — HYDROMORPHONE HYDROCHLORIDE 1 MG: 2 INJECTION INTRAMUSCULAR; INTRAVENOUS; SUBCUTANEOUS at 08:03

## 2018-01-16 RX ADMIN — LOSARTAN POTASSIUM 1 MG: 50 TABLET ORAL at 16:50

## 2018-01-16 RX ADMIN — HYDROMORPHONE HYDROCHLORIDE 1 MG: 2 INJECTION INTRAMUSCULAR; INTRAVENOUS; SUBCUTANEOUS at 05:31

## 2018-01-16 RX ADMIN — HYDROMORPHONE HYDROCHLORIDE 1 MG: 2 INJECTION INTRAMUSCULAR; INTRAVENOUS; SUBCUTANEOUS at 10:05

## 2018-01-16 RX ADMIN — HYDROMORPHONE HYDROCHLORIDE 1 MG: 2 INJECTION INTRAMUSCULAR; INTRAVENOUS; SUBCUTANEOUS at 02:48

## 2018-01-17 LAB
BACTERIA,URINE: (no result) /HPF
BILIRUBIN,URINE: NEGATIVE
CLARITY,URINE: CLEAR
COLOR,URINE: YELLOW
GLUCOSE,URINE: NEGATIVE MG/DL
MAGNESIUM: 1.8 MG/DL (ref 1.8–2.4)
NITRITE,URINE: NEGATIVE
PH,URINE: 6
PROTEIN,URINE: NEGATIVE MG/DL
RBC,URINE: 0 /HPF (ref 0–2)
SPECIFIC GRAVITY,URINE: 1.01
SQUAMOUS EPITHELIAL CELL,UR: (no result) /LPF
URIC ACID: 4.1 MG/DL (ref 2.6–6)
UROBILINOGEN,URINE: 1 MG/DL

## 2018-01-17 RX ADMIN — GABAPENTIN 1 MG: 300 CAPSULE ORAL at 13:21

## 2018-01-17 RX ADMIN — ATORVASTATIN CALCIUM 1 MG: 40 TABLET, FILM COATED ORAL at 20:26

## 2018-01-17 RX ADMIN — HYDROMORPHONE HYDROCHLORIDE 1 MG: 2 INJECTION INTRAMUSCULAR; INTRAVENOUS; SUBCUTANEOUS at 12:17

## 2018-01-17 RX ADMIN — ENOXAPARIN SODIUM 1 MG: 40 INJECTION SUBCUTANEOUS at 09:55

## 2018-01-17 RX ADMIN — Medication 1 CAP: at 09:52

## 2018-01-17 RX ADMIN — HYDROMORPHONE HYDROCHLORIDE 1 MG: 2 INJECTION INTRAMUSCULAR; INTRAVENOUS; SUBCUTANEOUS at 22:47

## 2018-01-17 RX ADMIN — HYDROMORPHONE HYDROCHLORIDE 1 MG: 2 INJECTION INTRAMUSCULAR; INTRAVENOUS; SUBCUTANEOUS at 20:25

## 2018-01-17 RX ADMIN — BACITRACIN 1 MLS/HR: 5000 INJECTION, POWDER, FOR SOLUTION INTRAMUSCULAR at 04:01

## 2018-01-17 RX ADMIN — SULFAMETHOXAZOLE AND TRIMETHOPRIM 1 TAB: 800; 160 TABLET ORAL at 17:58

## 2018-01-17 RX ADMIN — HYDROMORPHONE HYDROCHLORIDE 1 MG: 2 INJECTION INTRAMUSCULAR; INTRAVENOUS; SUBCUTANEOUS at 04:01

## 2018-01-17 RX ADMIN — Medication 1 CAP: at 20:26

## 2018-01-17 RX ADMIN — HYDROMORPHONE HYDROCHLORIDE 1 MG: 2 INJECTION INTRAMUSCULAR; INTRAVENOUS; SUBCUTANEOUS at 17:58

## 2018-01-17 RX ADMIN — HYDRALAZINE HYDROCHLORIDE 1 MG: 20 INJECTION INTRAMUSCULAR; INTRAVENOUS at 13:22

## 2018-01-17 RX ADMIN — HYDROMORPHONE HYDROCHLORIDE 1 MG: 2 INJECTION INTRAMUSCULAR; INTRAVENOUS; SUBCUTANEOUS at 01:12

## 2018-01-17 RX ADMIN — BACITRACIN 1 MLS/HR: 5000 INJECTION, POWDER, FOR SOLUTION INTRAMUSCULAR at 18:35

## 2018-01-17 RX ADMIN — GABAPENTIN 1 MG: 300 CAPSULE ORAL at 09:51

## 2018-01-17 RX ADMIN — GABAPENTIN 1 MG: 300 CAPSULE ORAL at 20:26

## 2018-01-17 RX ADMIN — HYDROMORPHONE HYDROCHLORIDE 1 MG: 2 INJECTION INTRAMUSCULAR; INTRAVENOUS; SUBCUTANEOUS at 07:51

## 2018-01-17 RX ADMIN — ASPIRIN 81 MG 1 MG: 81 TABLET ORAL at 09:51

## 2018-01-17 RX ADMIN — BACITRACIN 1 MLS/HR: 5000 INJECTION, POWDER, FOR SOLUTION INTRAMUSCULAR at 13:24

## 2018-01-17 RX ADMIN — HYDROMORPHONE HYDROCHLORIDE 1 MG: 2 INJECTION INTRAMUSCULAR; INTRAVENOUS; SUBCUTANEOUS at 15:38

## 2018-01-18 LAB
% EOS: 2 % (ref 0–5)
% LYMPHS: 19 % (ref 24–48)
% MONOS: 3 % (ref 0–10)
% SEGS: 76 % (ref 35–66)
ADD MAN DIFF?: YES
ALBUMIN SERPL-MCNC: 3.4 G/DL (ref 3.4–5)
ALBUMIN/GLOB SERPL: 0.9 {RATIO} (ref 1–1.7)
ALP SERPL-CCNC: 138 U/L (ref 46–116)
ALT (SGPT): 176 U/L (ref 14–59)
ANION GAP SERPL CALC-SCNC: 9 MMOL/L (ref 6–14)
ANISOCYTOSIS: (no result)
AST SERPL-CCNC: 137 U/L (ref 15–37)
BASO #: 0.1 X10^3/UL (ref 0–0.2)
BASO %: 0 % (ref 0–3)
BLOOD UREA NITROGEN: 15 MG/DL (ref 7–20)
BUN/CREAT SERPL: 17 (ref 6–20)
CALCIUM: 8.8 MG/DL (ref 8.5–10.1)
CHLORIDE: 108 MMOL/L (ref 98–107)
CO2 SERPL-SCNC: 25 MMOL/L (ref 21–32)
CREAT SERPL-MCNC: 0.9 MG/DL (ref 0.6–1)
EOS #: 0.1 X10^3/UL (ref 0–0.7)
EOS %: 1 % (ref 0–3)
GFR SERPLBLD BASED ON 1.73 SQ M-ARVRAT: 79.9 ML/MIN
GLOBULIN SER-MCNC: 3.8 G/DL (ref 2.2–3.8)
GLUCOSE SERPL-MCNC: 93 MG/DL (ref 70–99)
HCG SERPL-ACNC: 16.4 X10^3/UL (ref 4–11)
HEMATOCRIT: 27.1 % (ref 36–47)
HEMOGLOBIN: 8.9 G/DL (ref 12–15.5)
HGB A MFR BLD: 17.4 % (ref 96.4–98.8)
HGB A2 MFR BLD: 6.8 % (ref 1.8–3.2)
HGB C MFR BLD: 0 %
HGB F: 2.1 % (ref 0–2)
HGB S MFR BLD: 73.7 %
HGB VARIANT: (no result)
HYPOCHROMIA: PRESENT
LYMPH #: 4.3 X10^3/UL (ref 1–4.8)
LYMPH %: 26 % (ref 24–48)
MEAN CORPUSCULAR HEMOGLOBIN: 24 PG (ref 25–35)
MEAN CORPUSCULAR HGB CONC: 33 G/DL (ref 31–37)
MEAN CORPUSCULAR VOLUME: 74 FL (ref 79–100)
MICROCYTOSIS: SLIGHT
MONO #: 1.1 X10^3/UL (ref 0–1.1)
MONO %: 7 % (ref 0–9)
NEUT #: 10.9 X10^3UL (ref 1.8–7.7)
NEUT %: 66 % (ref 31–73)
NUCLEATED RBC: 5
PLATELET COUNT: 184 X10^3/UL (ref 140–400)
PLT ESTIMATE: ADEQUATE
POIKILOCYTOSIS: (no result)
POLYCHROMASIA: PRESENT
POTASSIUM SERPL-SCNC: 4.2 MMOL/L (ref 3.5–5.1)
RED BLOOD COUNT: 3.68 X10^6/UL (ref 3.5–5.4)
RED CELL DISTRIBUTION WIDTH: 19.2 % (ref 11.5–14.5)
RETIC COUNT: 2.8 % (ref 0.5–2.5)
SICKLE CELLS: PRESENT
SODIUM: 142 MMOL/L (ref 136–145)
TARGET CELLS: PRESENT
TOTAL BILIRUBIN: 4.5 MG/DL (ref 0.2–1)
TOTAL PROTEIN: 7.2 G/DL (ref 6.4–8.2)
TROPONINI: 0.14 NG/ML (ref 0–0.06)

## 2018-01-18 RX ADMIN — ASPIRIN 81 MG 1 MG: 81 TABLET ORAL at 12:24

## 2018-01-18 RX ADMIN — HYDROMORPHONE HYDROCHLORIDE 1 MG: 2 INJECTION INTRAMUSCULAR; INTRAVENOUS; SUBCUTANEOUS at 06:35

## 2018-01-18 RX ADMIN — HYDROMORPHONE HYDROCHLORIDE 1 MG: 2 INJECTION INTRAMUSCULAR; INTRAVENOUS; SUBCUTANEOUS at 09:01

## 2018-01-18 RX ADMIN — GABAPENTIN 1 MG: 300 CAPSULE ORAL at 14:00

## 2018-01-18 RX ADMIN — Medication 1 CAP: at 20:58

## 2018-01-18 RX ADMIN — HYDROMORPHONE HYDROCHLORIDE 1 MG: 2 INJECTION INTRAMUSCULAR; INTRAVENOUS; SUBCUTANEOUS at 01:10

## 2018-01-18 RX ADMIN — LOSARTAN POTASSIUM 1 MG: 50 TABLET ORAL at 12:25

## 2018-01-18 RX ADMIN — BACITRACIN 1 MLS/HR: 5000 INJECTION, POWDER, FOR SOLUTION INTRAMUSCULAR at 14:35

## 2018-01-18 RX ADMIN — SULFAMETHOXAZOLE AND TRIMETHOPRIM 1 TAB: 800; 160 TABLET ORAL at 12:22

## 2018-01-18 RX ADMIN — HYDRALAZINE HYDROCHLORIDE 1 MG: 20 INJECTION INTRAMUSCULAR; INTRAVENOUS at 03:42

## 2018-01-18 RX ADMIN — REGADENOSON 1 MG: 0.08 INJECTION, SOLUTION INTRAVENOUS at 11:07

## 2018-01-18 RX ADMIN — GABAPENTIN 1 MG: 300 CAPSULE ORAL at 20:58

## 2018-01-18 RX ADMIN — ATORVASTATIN CALCIUM 1 MG: 40 TABLET, FILM COATED ORAL at 20:58

## 2018-01-18 RX ADMIN — HYDROMORPHONE HYDROCHLORIDE 1 MG: 2 INJECTION INTRAMUSCULAR; INTRAVENOUS; SUBCUTANEOUS at 12:39

## 2018-01-18 RX ADMIN — HYDROMORPHONE HYDROCHLORIDE 1 MG: 2 INJECTION INTRAMUSCULAR; INTRAVENOUS; SUBCUTANEOUS at 17:48

## 2018-01-18 RX ADMIN — GABAPENTIN 1 MG: 300 CAPSULE ORAL at 12:22

## 2018-01-18 RX ADMIN — HYDROMORPHONE HYDROCHLORIDE 1 MG: 2 INJECTION INTRAMUSCULAR; INTRAVENOUS; SUBCUTANEOUS at 21:00

## 2018-01-18 RX ADMIN — SULFAMETHOXAZOLE AND TRIMETHOPRIM 1 TAB: 800; 160 TABLET ORAL at 20:58

## 2018-01-18 RX ADMIN — BACITRACIN 1 MLS/HR: 5000 INJECTION, POWDER, FOR SOLUTION INTRAMUSCULAR at 21:15

## 2018-01-18 RX ADMIN — BACITRACIN 1 MLS/HR: 5000 INJECTION, POWDER, FOR SOLUTION INTRAMUSCULAR at 00:00

## 2018-01-18 RX ADMIN — ENOXAPARIN SODIUM 1 MG: 40 INJECTION SUBCUTANEOUS at 12:26

## 2018-01-18 RX ADMIN — HYDROMORPHONE HYDROCHLORIDE 1 MG: 2 INJECTION INTRAMUSCULAR; INTRAVENOUS; SUBCUTANEOUS at 03:41

## 2018-01-18 RX ADMIN — BACITRACIN 1 MLS/HR: 5000 INJECTION, POWDER, FOR SOLUTION INTRAMUSCULAR at 06:35

## 2018-01-18 RX ADMIN — Medication 1 CAP: at 12:22

## 2018-01-19 LAB
ADD MAN DIFF?: NO
ANION GAP SERPL CALC-SCNC: 10 MMOL/L (ref 6–14)
BASO #: 0.1 X10^3/UL (ref 0–0.2)
BASO %: 1 % (ref 0–3)
BLOOD UREA NITROGEN: 13 MG/DL (ref 7–20)
CALCIUM: 8.4 MG/DL (ref 8.5–10.1)
CHLORIDE: 107 MMOL/L (ref 98–107)
CO2 SERPL-SCNC: 24 MMOL/L (ref 21–32)
CREAT SERPL-MCNC: 1 MG/DL (ref 0.6–1)
EOS #: 0.3 X10^3/UL (ref 0–0.7)
EOS %: 3 % (ref 0–3)
GFR SERPLBLD BASED ON 1.73 SQ M-ARVRAT: 70.7 ML/MIN
GLUCOSE SERPL-MCNC: 91 MG/DL (ref 70–99)
HCG SERPL-ACNC: 12.2 X10^3/UL (ref 4–11)
HEMATOCRIT: 23.8 % (ref 36–47)
HEMOGLOBIN: 7.8 G/DL (ref 12–15.5)
LYMPH #: 1.8 X10^3/UL (ref 1–4.8)
LYMPH %: 15 % (ref 24–48)
MEAN CORPUSCULAR HEMOGLOBIN: 25 PG (ref 25–35)
MEAN CORPUSCULAR HGB CONC: 33 G/DL (ref 31–37)
MEAN CORPUSCULAR VOLUME: 75 FL (ref 79–100)
MONO #: 1.5 X10^3/UL (ref 0–1.1)
MONO %: 12 % (ref 0–9)
NEUT #: 8.6 X10^3UL (ref 1.8–7.7)
NEUT %: 70 % (ref 31–73)
PLATELET COUNT: 188 X10^3/UL (ref 140–400)
POTASSIUM SERPL-SCNC: 4 MMOL/L (ref 3.5–5.1)
RED BLOOD COUNT: 3.18 X10^6/UL (ref 3.5–5.4)
RED CELL DISTRIBUTION WIDTH: 18.4 % (ref 11.5–14.5)
SODIUM: 141 MMOL/L (ref 136–145)

## 2018-01-19 RX ADMIN — SULFAMETHOXAZOLE AND TRIMETHOPRIM 1 TAB: 800; 160 TABLET ORAL at 09:30

## 2018-01-19 RX ADMIN — Medication 1 CAP: at 09:29

## 2018-01-19 RX ADMIN — BACITRACIN 1 MLS/HR: 5000 INJECTION, POWDER, FOR SOLUTION INTRAMUSCULAR at 04:17

## 2018-01-19 RX ADMIN — LOSARTAN POTASSIUM 1 MG: 50 TABLET ORAL at 09:29

## 2018-01-19 RX ADMIN — GABAPENTIN 1 MG: 300 CAPSULE ORAL at 09:30

## 2018-01-19 RX ADMIN — ASPIRIN 81 MG 1 MG: 81 TABLET ORAL at 09:31

## 2018-01-19 RX ADMIN — HYDROMORPHONE HYDROCHLORIDE 1 MG: 2 INJECTION INTRAMUSCULAR; INTRAVENOUS; SUBCUTANEOUS at 00:42

## 2018-01-19 RX ADMIN — BACITRACIN 1 MLS/HR: 5000 INJECTION, POWDER, FOR SOLUTION INTRAMUSCULAR at 09:32

## 2018-01-19 RX ADMIN — Medication 1 APP: at 09:31

## 2018-01-19 RX ADMIN — HYDROMORPHONE HYDROCHLORIDE 1 MG: 2 INJECTION INTRAMUSCULAR; INTRAVENOUS; SUBCUTANEOUS at 04:17

## 2018-01-19 RX ADMIN — ENOXAPARIN SODIUM 1 MG: 40 INJECTION SUBCUTANEOUS at 09:32

## 2019-01-10 ENCOUNTER — HOSPITAL ENCOUNTER (EMERGENCY)
Dept: HOSPITAL 61 - ER | Age: 54
Discharge: LEFT BEFORE DISCHARGE | End: 2019-01-10
Payer: COMMERCIAL

## 2019-01-10 VITALS — SYSTOLIC BLOOD PRESSURE: 173 MMHG | DIASTOLIC BLOOD PRESSURE: 85 MMHG

## 2019-01-10 VITALS — WEIGHT: 220 LBS | BODY MASS INDEX: 35.36 KG/M2 | HEIGHT: 66 IN

## 2019-01-10 DIAGNOSIS — Z53.21: ICD-10-CM

## 2019-01-10 DIAGNOSIS — D57.00: Primary | ICD-10-CM
